# Patient Record
Sex: MALE | Race: WHITE | NOT HISPANIC OR LATINO | Employment: UNEMPLOYED | URBAN - NONMETROPOLITAN AREA
[De-identification: names, ages, dates, MRNs, and addresses within clinical notes are randomized per-mention and may not be internally consistent; named-entity substitution may affect disease eponyms.]

---

## 2024-07-10 ENCOUNTER — APPOINTMENT (OUTPATIENT)
Dept: GENERAL RADIOLOGY | Facility: HOSPITAL | Age: 54
End: 2024-07-10

## 2024-07-10 ENCOUNTER — APPOINTMENT (OUTPATIENT)
Dept: CT IMAGING | Facility: HOSPITAL | Age: 54
End: 2024-07-10

## 2024-07-10 ENCOUNTER — HOSPITAL ENCOUNTER (INPATIENT)
Facility: HOSPITAL | Age: 54
LOS: 2 days | Discharge: HOME | End: 2024-07-12
Attending: EMERGENCY MEDICINE | Admitting: INTERNAL MEDICINE

## 2024-07-10 DIAGNOSIS — I48.92 ATRIAL FLUTTER: Primary | ICD-10-CM

## 2024-07-10 DIAGNOSIS — I48.92 ATRIAL FLUTTER, UNSPECIFIED TYPE: ICD-10-CM

## 2024-07-10 LAB
ALBUMIN SERPL-MCNC: 4.1 G/DL (ref 3.5–5.2)
ALBUMIN/GLOB SERPL: 1.1 G/DL
ALP SERPL-CCNC: 111 U/L (ref 39–117)
ALT SERPL W P-5'-P-CCNC: 21 U/L (ref 1–41)
AMPHET+METHAMPHET UR QL: NEGATIVE
AMPHETAMINES UR QL: NEGATIVE
ANION GAP SERPL CALCULATED.3IONS-SCNC: 15.9 MMOL/L (ref 5–15)
APTT PPP: 31.9 SECONDS (ref 26.5–34.5)
AST SERPL-CCNC: 38 U/L (ref 1–40)
BARBITURATES UR QL SCN: NEGATIVE
BASOPHILS # BLD AUTO: 0.04 10*3/MM3 (ref 0–0.2)
BASOPHILS # BLD AUTO: 0.05 10*3/MM3 (ref 0–0.2)
BASOPHILS NFR BLD AUTO: 0.7 % (ref 0–1.5)
BASOPHILS NFR BLD AUTO: 0.7 % (ref 0–1.5)
BENZODIAZ UR QL SCN: NEGATIVE
BILIRUB SERPL-MCNC: 0.9 MG/DL (ref 0–1.2)
BUN SERPL-MCNC: 5 MG/DL (ref 6–20)
BUN/CREAT SERPL: 7.9 (ref 7–25)
BUPRENORPHINE SERPL-MCNC: NEGATIVE NG/ML
CALCIUM SPEC-SCNC: 8.9 MG/DL (ref 8.6–10.5)
CANNABINOIDS SERPL QL: NEGATIVE
CHLORIDE SERPL-SCNC: 94 MMOL/L (ref 98–107)
CO2 SERPL-SCNC: 22.1 MMOL/L (ref 22–29)
COCAINE UR QL: NEGATIVE
CREAT SERPL-MCNC: 0.63 MG/DL (ref 0.76–1.27)
DEPRECATED RDW RBC AUTO: 47.8 FL (ref 37–54)
DEPRECATED RDW RBC AUTO: 48.2 FL (ref 37–54)
EGFRCR SERPLBLD CKD-EPI 2021: 113 ML/MIN/1.73
EOSINOPHIL # BLD AUTO: 0.15 10*3/MM3 (ref 0–0.4)
EOSINOPHIL # BLD AUTO: 0.15 10*3/MM3 (ref 0–0.4)
EOSINOPHIL NFR BLD AUTO: 2.1 % (ref 0.3–6.2)
EOSINOPHIL NFR BLD AUTO: 2.5 % (ref 0.3–6.2)
ERYTHROCYTE [DISTWIDTH] IN BLOOD BY AUTOMATED COUNT: 14.3 % (ref 12.3–15.4)
ERYTHROCYTE [DISTWIDTH] IN BLOOD BY AUTOMATED COUNT: 14.5 % (ref 12.3–15.4)
FENTANYL UR-MCNC: NEGATIVE NG/ML
GEN 5 2HR TROPONIN T REFLEX: 7 NG/L
GLOBULIN UR ELPH-MCNC: 3.6 GM/DL
GLUCOSE SERPL-MCNC: 86 MG/DL (ref 65–99)
HCT VFR BLD AUTO: 43.1 % (ref 37.5–51)
HCT VFR BLD AUTO: 47.8 % (ref 37.5–51)
HGB BLD-MCNC: 15.2 G/DL (ref 13–17.7)
HGB BLD-MCNC: 17.2 G/DL (ref 13–17.7)
IMM GRANULOCYTES # BLD AUTO: 0.01 10*3/MM3 (ref 0–0.05)
IMM GRANULOCYTES # BLD AUTO: 0.02 10*3/MM3 (ref 0–0.05)
IMM GRANULOCYTES NFR BLD AUTO: 0.1 % (ref 0–0.5)
IMM GRANULOCYTES NFR BLD AUTO: 0.3 % (ref 0–0.5)
INR PPP: 1.14 (ref 0.9–1.1)
LYMPHOCYTES # BLD AUTO: 2.52 10*3/MM3 (ref 0.7–3.1)
LYMPHOCYTES # BLD AUTO: 3.05 10*3/MM3 (ref 0.7–3.1)
LYMPHOCYTES NFR BLD AUTO: 41.7 % (ref 19.6–45.3)
LYMPHOCYTES NFR BLD AUTO: 41.8 % (ref 19.6–45.3)
MCH RBC QN AUTO: 33 PG (ref 26.6–33)
MCH RBC QN AUTO: 33.7 PG (ref 26.6–33)
MCHC RBC AUTO-ENTMCNC: 35.3 G/DL (ref 31.5–35.7)
MCHC RBC AUTO-ENTMCNC: 36 G/DL (ref 31.5–35.7)
MCV RBC AUTO: 93.5 FL (ref 79–97)
MCV RBC AUTO: 93.7 FL (ref 79–97)
METHADONE UR QL SCN: NEGATIVE
MONOCYTES # BLD AUTO: 0.41 10*3/MM3 (ref 0.1–0.9)
MONOCYTES # BLD AUTO: 0.46 10*3/MM3 (ref 0.1–0.9)
MONOCYTES NFR BLD AUTO: 5.6 % (ref 5–12)
MONOCYTES NFR BLD AUTO: 7.6 % (ref 5–12)
NEUTROPHILS NFR BLD AUTO: 2.86 10*3/MM3 (ref 1.7–7)
NEUTROPHILS NFR BLD AUTO: 3.62 10*3/MM3 (ref 1.7–7)
NEUTROPHILS NFR BLD AUTO: 47.2 % (ref 42.7–76)
NEUTROPHILS NFR BLD AUTO: 49.7 % (ref 42.7–76)
NRBC BLD AUTO-RTO: 0 /100 WBC (ref 0–0.2)
NRBC BLD AUTO-RTO: 0 /100 WBC (ref 0–0.2)
OPIATES UR QL: NEGATIVE
OXYCODONE UR QL SCN: NEGATIVE
PCP UR QL SCN: NEGATIVE
PLATELET # BLD AUTO: 232 10*3/MM3 (ref 140–450)
PLATELET # BLD AUTO: 250 10*3/MM3 (ref 140–450)
PMV BLD AUTO: 9.2 FL (ref 6–12)
PMV BLD AUTO: 9.8 FL (ref 6–12)
POTASSIUM SERPL-SCNC: 3.9 MMOL/L (ref 3.5–5.2)
PROT SERPL-MCNC: 7.7 G/DL (ref 6–8.5)
PROTHROMBIN TIME: 14.7 SECONDS (ref 12.1–14.7)
RBC # BLD AUTO: 4.6 10*6/MM3 (ref 4.14–5.8)
RBC # BLD AUTO: 5.11 10*6/MM3 (ref 4.14–5.8)
SODIUM SERPL-SCNC: 132 MMOL/L (ref 136–145)
TRICYCLICS UR QL SCN: NEGATIVE
TROPONIN T DELTA: -2 NG/L
TROPONIN T SERPL HS-MCNC: 9 NG/L
UFH PPP CHRO-ACNC: <0.1 IU/ML (ref 0.3–0.7)
WBC NRBC COR # BLD AUTO: 6.05 10*3/MM3 (ref 3.4–10.8)
WBC NRBC COR # BLD AUTO: 7.29 10*3/MM3 (ref 3.4–10.8)

## 2024-07-10 PROCEDURE — 71045 X-RAY EXAM CHEST 1 VIEW: CPT

## 2024-07-10 PROCEDURE — 25010000002 HEPARIN (PORCINE) 25000-0.45 UT/250ML-% SOLUTION: Performed by: INTERNAL MEDICINE

## 2024-07-10 PROCEDURE — 85520 HEPARIN ASSAY: CPT | Performed by: INTERNAL MEDICINE

## 2024-07-10 PROCEDURE — 80053 COMPREHEN METABOLIC PANEL: CPT | Performed by: INTERNAL MEDICINE

## 2024-07-10 PROCEDURE — 85025 COMPLETE CBC W/AUTO DIFF WBC: CPT | Performed by: EMERGENCY MEDICINE

## 2024-07-10 PROCEDURE — 99223 1ST HOSP IP/OBS HIGH 75: CPT | Performed by: INTERNAL MEDICINE

## 2024-07-10 PROCEDURE — 71250 CT THORAX DX C-: CPT | Performed by: RADIOLOGY

## 2024-07-10 PROCEDURE — 71250 CT THORAX DX C-: CPT

## 2024-07-10 PROCEDURE — 25010000002 ADENOSINE PER 6 MG

## 2024-07-10 PROCEDURE — 99285 EMERGENCY DEPT VISIT HI MDM: CPT

## 2024-07-10 PROCEDURE — 25810000003 LACTATED RINGERS PER 1000 ML: Performed by: INTERNAL MEDICINE

## 2024-07-10 PROCEDURE — 93010 ELECTROCARDIOGRAM REPORT: CPT | Performed by: SPECIALIST

## 2024-07-10 PROCEDURE — 85025 COMPLETE CBC W/AUTO DIFF WBC: CPT | Performed by: INTERNAL MEDICINE

## 2024-07-10 PROCEDURE — 25010000002 HEPARIN (PORCINE) PER 1000 UNITS: Performed by: INTERNAL MEDICINE

## 2024-07-10 PROCEDURE — 80307 DRUG TEST PRSMV CHEM ANLYZR: CPT | Performed by: STUDENT IN AN ORGANIZED HEALTH CARE EDUCATION/TRAINING PROGRAM

## 2024-07-10 PROCEDURE — 71045 X-RAY EXAM CHEST 1 VIEW: CPT | Performed by: RADIOLOGY

## 2024-07-10 PROCEDURE — 83735 ASSAY OF MAGNESIUM: CPT | Performed by: INTERNAL MEDICINE

## 2024-07-10 PROCEDURE — 84443 ASSAY THYROID STIM HORMONE: CPT | Performed by: INTERNAL MEDICINE

## 2024-07-10 PROCEDURE — 85610 PROTHROMBIN TIME: CPT | Performed by: INTERNAL MEDICINE

## 2024-07-10 PROCEDURE — 85730 THROMBOPLASTIN TIME PARTIAL: CPT | Performed by: INTERNAL MEDICINE

## 2024-07-10 PROCEDURE — 25010000002 ADENOSINE PER 6 MG: Performed by: EMERGENCY MEDICINE

## 2024-07-10 PROCEDURE — 80053 COMPREHEN METABOLIC PANEL: CPT | Performed by: EMERGENCY MEDICINE

## 2024-07-10 PROCEDURE — 93005 ELECTROCARDIOGRAM TRACING: CPT | Performed by: EMERGENCY MEDICINE

## 2024-07-10 PROCEDURE — 36415 COLL VENOUS BLD VENIPUNCTURE: CPT

## 2024-07-10 PROCEDURE — 84484 ASSAY OF TROPONIN QUANT: CPT | Performed by: EMERGENCY MEDICINE

## 2024-07-10 RX ORDER — SODIUM CHLORIDE 0.9 % (FLUSH) 0.9 %
10 SYRINGE (ML) INJECTION AS NEEDED
Status: DISCONTINUED | OUTPATIENT
Start: 2024-07-10 | End: 2024-07-12 | Stop reason: HOSPADM

## 2024-07-10 RX ORDER — ADENOSINE 3 MG/ML
12 INJECTION, SOLUTION INTRAVENOUS ONCE
Status: COMPLETED | OUTPATIENT
Start: 2024-07-10 | End: 2024-07-10

## 2024-07-10 RX ORDER — ADENOSINE 3 MG/ML
6 INJECTION, SOLUTION INTRAVENOUS ONCE
Status: COMPLETED | OUTPATIENT
Start: 2024-07-10 | End: 2024-07-10

## 2024-07-10 RX ORDER — BISACODYL 10 MG
10 SUPPOSITORY, RECTAL RECTAL DAILY PRN
Status: DISCONTINUED | OUTPATIENT
Start: 2024-07-10 | End: 2024-07-12 | Stop reason: HOSPADM

## 2024-07-10 RX ORDER — SODIUM CHLORIDE 9 MG/ML
40 INJECTION, SOLUTION INTRAVENOUS AS NEEDED
Status: DISCONTINUED | OUTPATIENT
Start: 2024-07-10 | End: 2024-07-12 | Stop reason: HOSPADM

## 2024-07-10 RX ORDER — SODIUM CHLORIDE, SODIUM LACTATE, POTASSIUM CHLORIDE, CALCIUM CHLORIDE 600; 310; 30; 20 MG/100ML; MG/100ML; MG/100ML; MG/100ML
75 INJECTION, SOLUTION INTRAVENOUS CONTINUOUS
Status: DISCONTINUED | OUTPATIENT
Start: 2024-07-11 | End: 2024-07-12 | Stop reason: HOSPADM

## 2024-07-10 RX ORDER — SODIUM CHLORIDE 0.9 % (FLUSH) 0.9 %
10 SYRINGE (ML) INJECTION EVERY 12 HOURS SCHEDULED
Status: DISCONTINUED | OUTPATIENT
Start: 2024-07-10 | End: 2024-07-12 | Stop reason: HOSPADM

## 2024-07-10 RX ORDER — AMOXICILLIN 250 MG
2 CAPSULE ORAL 2 TIMES DAILY PRN
Status: DISCONTINUED | OUTPATIENT
Start: 2024-07-10 | End: 2024-07-12 | Stop reason: HOSPADM

## 2024-07-10 RX ORDER — DILTIAZEM HYDROCHLORIDE 5 MG/ML
10 INJECTION INTRAVENOUS ONCE
Status: COMPLETED | OUTPATIENT
Start: 2024-07-10 | End: 2024-07-10

## 2024-07-10 RX ORDER — ADENOSINE 3 MG/ML
INJECTION, SOLUTION INTRAVENOUS
Status: COMPLETED
Start: 2024-07-10 | End: 2024-07-10

## 2024-07-10 RX ORDER — NICOTINE 21 MG/24HR
1 PATCH, TRANSDERMAL 24 HOURS TRANSDERMAL
Status: DISCONTINUED | OUTPATIENT
Start: 2024-07-10 | End: 2024-07-12 | Stop reason: HOSPADM

## 2024-07-10 RX ORDER — DILTIAZEM HYDROCHLORIDE 5 MG/ML
20 INJECTION INTRAVENOUS ONCE
Status: DISCONTINUED | OUTPATIENT
Start: 2024-07-10 | End: 2024-07-10

## 2024-07-10 RX ORDER — LORAZEPAM 2 MG/ML
0.5 INJECTION INTRAMUSCULAR EVERY 4 HOURS PRN
Status: DISCONTINUED | OUTPATIENT
Start: 2024-07-10 | End: 2024-07-12 | Stop reason: HOSPADM

## 2024-07-10 RX ORDER — POLYETHYLENE GLYCOL 3350 17 G/17G
17 POWDER, FOR SOLUTION ORAL DAILY PRN
Status: DISCONTINUED | OUTPATIENT
Start: 2024-07-10 | End: 2024-07-12 | Stop reason: HOSPADM

## 2024-07-10 RX ORDER — HEPARIN SODIUM 10000 [USP'U]/100ML
14 INJECTION, SOLUTION INTRAVENOUS
Status: DISCONTINUED | OUTPATIENT
Start: 2024-07-10 | End: 2024-07-11

## 2024-07-10 RX ORDER — HEPARIN SODIUM 5000 [USP'U]/ML
5000 INJECTION, SOLUTION INTRAVENOUS; SUBCUTANEOUS EVERY 8 HOURS SCHEDULED
Status: DISCONTINUED | OUTPATIENT
Start: 2024-07-10 | End: 2024-07-10

## 2024-07-10 RX ORDER — BISACODYL 5 MG/1
5 TABLET, DELAYED RELEASE ORAL DAILY PRN
Status: DISCONTINUED | OUTPATIENT
Start: 2024-07-10 | End: 2024-07-12 | Stop reason: HOSPADM

## 2024-07-10 RX ADMIN — Medication 10 ML: at 22:53

## 2024-07-10 RX ADMIN — SODIUM CHLORIDE, POTASSIUM CHLORIDE, SODIUM LACTATE AND CALCIUM CHLORIDE 75 ML/HR: 600; 310; 30; 20 INJECTION, SOLUTION INTRAVENOUS at 23:40

## 2024-07-10 RX ADMIN — HEPARIN SODIUM 5000 UNITS: 5000 INJECTION INTRAVENOUS; SUBCUTANEOUS at 22:53

## 2024-07-10 RX ADMIN — DILTIAZEM HYDROCHLORIDE 10 MG: 5 INJECTION INTRAVENOUS at 18:09

## 2024-07-10 RX ADMIN — ADENOSINE 6 MG: 3 INJECTION, SOLUTION INTRAVENOUS at 17:55

## 2024-07-10 RX ADMIN — SODIUM CHLORIDE 5 MG/HR: 900 INJECTION, SOLUTION INTRAVENOUS at 18:08

## 2024-07-10 RX ADMIN — ADENOSINE 12 MG: 3 INJECTION, SOLUTION INTRAVENOUS at 17:59

## 2024-07-10 RX ADMIN — DILTIAZEM HYDROCHLORIDE 10 MG: 5 INJECTION INTRAVENOUS at 18:14

## 2024-07-10 RX ADMIN — HEPARIN SODIUM 12 UNITS/KG/HR: 10000 INJECTION, SOLUTION INTRAVENOUS at 23:41

## 2024-07-10 NOTE — ED PROVIDER NOTES
"Subjective   History of Present Illness  54-year-old white male presents for abnormal EKG.  Patient was seen for his regular checkup and noted to be tachycardic.  His provider did an EKG which was abnormal and referred him to the ED.  He states that he had had an episode when he was in his 30s where he had to come to the ER and had to be given medicine \"which stopped my heart\".  He has not had any recent chest pain, shortness of breath, palpitations or other complaints.  He smokes 1 pack a day and drinks 8-10 beers per day.  Denies any other drug use.      Review of Systems   All other systems reviewed and are negative.      No past medical history on file.    No Known Allergies    No past surgical history on file.    No family history on file.    Social History     Socioeconomic History   • Marital status: Single           Objective   Physical Exam  Vitals and nursing note reviewed. Exam conducted with a chaperone present.   Constitutional:       Appearance: Normal appearance. He is normal weight.   HENT:      Head: Normocephalic and atraumatic.      Mouth/Throat:      Mouth: Mucous membranes are moist.      Pharynx: Oropharynx is clear.   Cardiovascular:      Rate and Rhythm: Normal rate and regular rhythm.      Heart sounds: Normal heart sounds. No murmur heard.     No friction rub. No gallop.   Pulmonary:      Effort: Pulmonary effort is normal. No respiratory distress.      Breath sounds: Normal breath sounds. No wheezing, rhonchi or rales.   Chest:      Chest wall: No tenderness.   Abdominal:      General: Abdomen is flat. Bowel sounds are normal. There is no distension.      Palpations: Abdomen is soft.      Tenderness: There is no abdominal tenderness.   Musculoskeletal:         General: Normal range of motion.      Right lower leg: No edema.      Left lower leg: No edema.   Skin:     General: Skin is warm and dry.   Neurological:      General: No focal deficit present.      Mental Status: He is alert and " oriented to person, place, and time.   Psychiatric:         Mood and Affect: Mood normal.         Behavior: Behavior normal.         Procedures       Results for orders placed or performed during the hospital encounter of 07/10/24   Comprehensive Metabolic Panel    Specimen: Arm, Right; Blood   Result Value Ref Range    Glucose 86 65 - 99 mg/dL    BUN 5 (L) 6 - 20 mg/dL    Creatinine 0.63 (L) 0.76 - 1.27 mg/dL    Sodium 132 (L) 136 - 145 mmol/L    Potassium 3.9 3.5 - 5.2 mmol/L    Chloride 94 (L) 98 - 107 mmol/L    CO2 22.1 22.0 - 29.0 mmol/L    Calcium 8.9 8.6 - 10.5 mg/dL    Total Protein 7.7 6.0 - 8.5 g/dL    Albumin 4.1 3.5 - 5.2 g/dL    ALT (SGPT) 21 1 - 41 U/L    AST (SGOT) 38 1 - 40 U/L    Alkaline Phosphatase 111 39 - 117 U/L    Total Bilirubin 0.9 0.0 - 1.2 mg/dL    Globulin 3.6 gm/dL    A/G Ratio 1.1 g/dL    BUN/Creatinine Ratio 7.9 7.0 - 25.0    Anion Gap 15.9 (H) 5.0 - 15.0 mmol/L    eGFR 113.0 >60.0 mL/min/1.73   High Sensitivity Troponin T    Specimen: Arm, Right; Blood   Result Value Ref Range    HS Troponin T 9 <22 ng/L   CBC Auto Differential    Specimen: Arm, Right; Blood   Result Value Ref Range    WBC 7.29 3.40 - 10.80 10*3/mm3    RBC 5.11 4.14 - 5.80 10*6/mm3    Hemoglobin 17.2 13.0 - 17.7 g/dL    Hematocrit 47.8 37.5 - 51.0 %    MCV 93.5 79.0 - 97.0 fL    MCH 33.7 (H) 26.6 - 33.0 pg    MCHC 36.0 (H) 31.5 - 35.7 g/dL    RDW 14.3 12.3 - 15.4 %    RDW-SD 47.8 37.0 - 54.0 fl    MPV 9.2 6.0 - 12.0 fL    Platelets 250 140 - 450 10*3/mm3    Neutrophil % 49.7 42.7 - 76.0 %    Lymphocyte % 41.8 19.6 - 45.3 %    Monocyte % 5.6 5.0 - 12.0 %    Eosinophil % 2.1 0.3 - 6.2 %    Basophil % 0.7 0.0 - 1.5 %    Immature Grans % 0.1 0.0 - 0.5 %    Neutrophils, Absolute 3.62 1.70 - 7.00 10*3/mm3    Lymphocytes, Absolute 3.05 0.70 - 3.10 10*3/mm3    Monocytes, Absolute 0.41 0.10 - 0.90 10*3/mm3    Eosinophils, Absolute 0.15 0.00 - 0.40 10*3/mm3    Basophils, Absolute 0.05 0.00 - 0.20 10*3/mm3    Immature Grans,  Absolute 0.01 0.00 - 0.05 10*3/mm3    nRBC 0.0 0.0 - 0.2 /100 WBC   ECG 12 Lead Tachycardia   Result Value Ref Range    QT Interval 316 ms    QTC Interval 509 ms   ECG 12 Lead Tachycardia   Result Value Ref Range    QT Interval 306 ms    QTC Interval 528 ms         ED Course  ED Course as of 07/10/24 2034   Wed Jul 10, 2024   1814 ECG 12 Lead Tachycardia  SVT.  Rate 179.  Normal axis.  Inferior T wave inversions.  No acute ischemic changes.  Abnormal EKG. [BC]   1816 ECG 12 Lead Tachycardia  Narrow complex tachycardia, probably atrial flutter.  Rate 156.  Normal axis.  Prolonged QT interval.  Inferior T wave inversions.  No acute ischemic changes.  Abnormal EKG [BC]   1950 Patient care was taken over from Dr. Stone at shift change.  Patient remains in atrial flutter with variable conduction between 3-1 and 41 conduction.  Currently on Cardizem 12.5.  -Plan to admit to hospitalist    CT chest and abdomen have been ordered rule out additional possible causes of irregular heart rate.  Urine drug screen is also been ordered.      Electronially signed by Miroslava Bradford DO, 07/10/24, 7:51 PM EDT.   [LK]   2019 Spoke with Dr. Vaca who accepted patient for PCU level of care.  Drug screen, CT chest been added to look for additional precipitating factors for forted new onset cardiac arrhythmia. [LK]      ED Course User Index  [BC] Jas Stone MD  [LK] Miroslava Bradford DO                                             Medical Decision Making  Amount and/or Complexity of Data Reviewed  Labs: ordered.  Radiology: ordered.  ECG/medicine tests: ordered. Decision-making details documented in ED Course.    Risk  Prescription drug management.        Final diagnoses:   Atrial flutter, unspecified type       ED Disposition  ED Disposition       ED Disposition   Intended Admit    Condition   --    Comment   --               No follow-up provider specified.       Medication List      No changes were made to your prescriptions during this  visit.          Ref Range    Heparin Anti-Xa (UFH) 0.16 (L) 0.30 - 0.70 IU/ml   Basic Metabolic Panel    Specimen: Blood   Result Value Ref Range    Glucose 95 65 - 99 mg/dL    BUN 6 6 - 20 mg/dL    Creatinine 0.59 (L) 0.76 - 1.27 mg/dL    Sodium 130 (L) 136 - 145 mmol/L    Potassium 4.3 3.5 - 5.2 mmol/L    Chloride 97 (L) 98 - 107 mmol/L    CO2 24.7 22.0 - 29.0 mmol/L    Calcium 8.2 (L) 8.6 - 10.5 mg/dL    BUN/Creatinine Ratio 10.2 7.0 - 25.0    Anion Gap 8.3 5.0 - 15.0 mmol/L    eGFR 115.3 >60.0 mL/min/1.73   Magnesium    Specimen: Blood   Result Value Ref Range    Magnesium 1.5 (L) 1.6 - 2.6 mg/dL   ECG 12 Lead Tachycardia   Result Value Ref Range    QT Interval 316 ms    QTC Interval 509 ms   ECG 12 Lead Tachycardia   Result Value Ref Range    QT Interval 306 ms    QTC Interval 528 ms   ECG 12 Lead Rhythm Change   Result Value Ref Range    QT Interval 332 ms    QTC Interval 453 ms   ECG 12 Lead Other; s/p cardioversion   Result Value Ref Range    QT Interval 382 ms    QTC Interval 426 ms   Adult Transthoracic Echo Complete W/ Cont if Necessary Per Protocol   Result Value Ref Range    LVIDd 3.9 cm    LVIDs 3.7 cm    IVSd 0.98 cm    LVPWd 0.9 cm    FS 5.9 %    IVS/LVPW 0.78 cm    ESV(cubed) 50.2 ml    LV Sys Vol (BSA corrected) 6.8 cm2    EDV(cubed) 60.2 ml    LV Lowe Vol (BSA corrected) 16.7 cm2    LV mass(C)d 145.0 grams    LVOT area 2.5 cm2    LVOT diam 1.80 cm    EDV(MOD-sp4) 29.7 ml    ESV(MOD-sp4) 12.1 ml    SV(MOD-sp4) 17.6 ml    SVi(MOD-SP4) 9.9 ml/m2    EF(MOD-sp4) 59.3 %    MV E max pedro 65.1 cm/sec    MV A max pedro 51.8 cm/sec    MV E/A 1.26     LA ESV Index (BP) 13.5 ml/m2    Med Peak E' Pedro 8.6 cm/sec    Lat Peak E' Pedro 11.5 cm/sec    TR max pedro 266.0 cm/sec    Avg E/e' ratio 6.48     TAPSE (>1.6) 2.37 cm    LA dimension (2D)  3.2 cm    Ao pk pedro 116.0 cm/sec    Ao max PG 5.4 mmHg    Ao mean PG 3.0 mmHg    Ao V2 VTI 20.7 cm    TR max PG 28.3 mmHg    RVSP(TR) 38.3 mmHg    RAP systole 10.0 mmHg    PA acc time 0.11 sec     Ao root diam 2.6 cm         ED Course  ED Course as of 07/28/24 0834   Wed Jul 10, 2024   1814 ECG 12 Lead Tachycardia  SVT.  Rate 179.  Normal axis.  Inferior T wave inversions.  No acute ischemic changes.  Abnormal EKG. [BC]   1816 ECG 12 Lead Tachycardia  Narrow complex tachycardia, probably atrial flutter.  Rate 156.  Normal axis.  Prolonged QT interval.  Inferior T wave inversions.  No acute ischemic changes.  Abnormal EKG [BC]   1950 Patient care was taken over from Dr. Stone at shift change.  Patient remains in atrial flutter with variable conduction between 3-1 and 41 conduction.  Currently on Cardizem 12.5.  -Plan to admit to hospitalist    CT chest and abdomen have been ordered rule out additional possible causes of irregular heart rate.  Urine drug screen is also been ordered.      Electronially signed by Miroslava Bradford DO, 07/10/24, 7:51 PM EDT.   [LK]   2019 Spoke with Dr. Poe who accepted patient for PCU level of care.  Drug screen, CT chest been added to look for additional precipitating factors for forted new onset cardiac arrhythmia. [LK]      ED Course User Index  [BC] Jas Stone MD  [LK] Miroslava Bradford DO                                             Medical Decision Making  Problems Addressed:  Atrial flutter, unspecified type: complicated acute illness or injury    Amount and/or Complexity of Data Reviewed  Labs: ordered.  Radiology: ordered.  ECG/medicine tests: ordered. Decision-making details documented in ED Course.    Risk  Prescription drug management.  Decision regarding hospitalization.        Final diagnoses:   Atrial flutter, unspecified type       ED Disposition  ED Disposition       ED Disposition   Decision to Admit    Condition   --    Comment   Level of Care: Progressive Care [20]   Diagnosis: Atrial flutter [427.32.ICD-9-CM]   Admitting Physician: JAEL POE [[id number]]   Certification: I Certify That Inpatient Hospital Services Are Medically Necessary For Greater Than 2  Midnights                 Margarita Loyd, APRN  [address] [facility] [address]  [phone]    Follow up in 3 week(s)      Ramón Lyons MD  [address] [place] [address] [phone]    Follow up in 6 day(s)      Provider, No Known  [facility] [address]  [phone]               Medication List        New Prescriptions      Eliquis 5 MG tablet tablet  Generic drug: apixaban  Take 1 tablet by mouth Every 12 (Twelve) Hours. Indications: Atrial Fibrillation               Where to Get Your Medications        These medications were sent to [facility] [address] [place] [address]      Hours: Monday to Friday 7 AM to 6 PM Phone: [phone]   Eliquis 5 MG tablet tablet       [facility] [place] [facility] [address] - [phone]  - [phone]   [address] [facility] [place] [place] [address]      Phone: [phone]   metoprolol tartrate 50 MG tablet  pantoprazole 20 MG EC tablet            Miroslava Bradford DO  07/28/24 0814

## 2024-07-10 NOTE — ED NOTES
Patient has been brought back to room in wheelchair at this time. Patient has been placed on Zoll monitor at this time. Dr. Stone and lead RN are at bedside with patient at this time. Patient placed on cardiac monitoring.

## 2024-07-11 ENCOUNTER — APPOINTMENT (OUTPATIENT)
Dept: CARDIOLOGY | Facility: HOSPITAL | Age: 54
End: 2024-07-11

## 2024-07-11 ENCOUNTER — APPOINTMENT (OUTPATIENT)
Dept: ULTRASOUND IMAGING | Facility: HOSPITAL | Age: 54
End: 2024-07-11

## 2024-07-11 ENCOUNTER — ANESTHESIA EVENT (OUTPATIENT)
Dept: CARDIOLOGY | Facility: HOSPITAL | Age: 54
End: 2024-07-11

## 2024-07-11 ENCOUNTER — ANESTHESIA (OUTPATIENT)
Dept: CARDIOLOGY | Facility: HOSPITAL | Age: 54
End: 2024-07-11

## 2024-07-11 DIAGNOSIS — I48.92 ATRIAL FLUTTER, UNSPECIFIED TYPE: Primary | ICD-10-CM

## 2024-07-11 PROBLEM — E44.0 MODERATE MALNUTRITION: Status: ACTIVE | Noted: 2024-07-11

## 2024-07-11 LAB
ALBUMIN SERPL-MCNC: 3.5 G/DL (ref 3.5–5.2)
ALBUMIN/GLOB SERPL: 1.2 G/DL
ALP SERPL-CCNC: 94 U/L (ref 39–117)
ALT SERPL W P-5'-P-CCNC: 18 U/L (ref 1–41)
ANION GAP SERPL CALCULATED.3IONS-SCNC: 18.9 MMOL/L (ref 5–15)
AST SERPL-CCNC: 33 U/L (ref 1–40)
BH CV ECHO MEAS - AO MAX PG: 5.4 MMHG
BH CV ECHO MEAS - AO MEAN PG: 3 MMHG
BH CV ECHO MEAS - AO ROOT DIAM: 2.6 CM
BH CV ECHO MEAS - AO V2 MAX: 116 CM/SEC
BH CV ECHO MEAS - AO V2 VTI: 20.7 CM
BH CV ECHO MEAS - EDV(CUBED): 60.2 ML
BH CV ECHO MEAS - EDV(MOD-SP4): 29.7 ML
BH CV ECHO MEAS - EF(MOD-SP4): 59.3 %
BH CV ECHO MEAS - ESV(CUBED): 50.2 ML
BH CV ECHO MEAS - ESV(MOD-SP4): 12.1 ML
BH CV ECHO MEAS - FS: 5.9 %
BH CV ECHO MEAS - IVS/LVPW: 0.78 CM
BH CV ECHO MEAS - IVSD: 0.98 CM
BH CV ECHO MEAS - LA DIMENSION: 3.2 CM
BH CV ECHO MEAS - LAT PEAK E' VEL: 11.5 CM/SEC
BH CV ECHO MEAS - LV DIASTOLIC VOL/BSA (35-75): 16.7 CM2
BH CV ECHO MEAS - LV MASS(C)D: 145 GRAMS
BH CV ECHO MEAS - LV SYSTOLIC VOL/BSA (12-30): 6.8 CM2
BH CV ECHO MEAS - LVIDD: 3.9 CM
BH CV ECHO MEAS - LVIDS: 3.7 CM
BH CV ECHO MEAS - LVOT AREA: 2.5 CM2
BH CV ECHO MEAS - LVOT DIAM: 1.8 CM
BH CV ECHO MEAS - LVPWD: 0.9 CM
BH CV ECHO MEAS - MED PEAK E' VEL: 8.6 CM/SEC
BH CV ECHO MEAS - MV A MAX VEL: 51.8 CM/SEC
BH CV ECHO MEAS - MV E MAX VEL: 65.1 CM/SEC
BH CV ECHO MEAS - MV E/A: 1.26
BH CV ECHO MEAS - PA ACC TIME: 0.11 SEC
BH CV ECHO MEAS - RAP SYSTOLE: 10 MMHG
BH CV ECHO MEAS - RVSP: 38.3 MMHG
BH CV ECHO MEAS - SV(MOD-SP4): 17.6 ML
BH CV ECHO MEAS - SVI(MOD-SP4): 9.9 ML/M2
BH CV ECHO MEAS - TAPSE (>1.6): 2.37 CM
BH CV ECHO MEAS - TR MAX PG: 28.3 MMHG
BH CV ECHO MEAS - TR MAX VEL: 266 CM/SEC
BH CV ECHO MEASUREMENTS AVERAGE E/E' RATIO: 6.48
BILIRUB SERPL-MCNC: 1 MG/DL (ref 0–1.2)
BUN SERPL-MCNC: 4 MG/DL (ref 6–20)
BUN/CREAT SERPL: 7.8 (ref 7–25)
CALCIUM SPEC-SCNC: 8.1 MG/DL (ref 8.6–10.5)
CHLORIDE SERPL-SCNC: 101 MMOL/L (ref 98–107)
CO2 SERPL-SCNC: 17.1 MMOL/L (ref 22–29)
CREAT SERPL-MCNC: 0.51 MG/DL (ref 0.76–1.27)
EGFRCR SERPLBLD CKD-EPI 2021: 120.5 ML/MIN/1.73
GLOBULIN UR ELPH-MCNC: 2.9 GM/DL
GLUCOSE SERPL-MCNC: 71 MG/DL (ref 65–99)
LEFT ATRIUM VOLUME INDEX: 13.5 ML/M2
MAGNESIUM SERPL-MCNC: 1.6 MG/DL (ref 1.6–2.6)
POTASSIUM SERPL-SCNC: 3.6 MMOL/L (ref 3.5–5.2)
PROT SERPL-MCNC: 6.4 G/DL (ref 6–8.5)
QT INTERVAL: 306 MS
QT INTERVAL: 316 MS
QT INTERVAL: 332 MS
QT INTERVAL: 382 MS
QTC INTERVAL: 426 MS
QTC INTERVAL: 453 MS
QTC INTERVAL: 509 MS
QTC INTERVAL: 528 MS
SODIUM SERPL-SCNC: 137 MMOL/L (ref 136–145)
TSH SERPL DL<=0.05 MIU/L-ACNC: 1.92 UIU/ML (ref 0.27–4.2)
UFH PPP CHRO-ACNC: 0.14 IU/ML (ref 0.3–0.7)
UFH PPP CHRO-ACNC: 0.16 IU/ML (ref 0.3–0.7)

## 2024-07-11 PROCEDURE — 93880 EXTRACRANIAL BILAT STUDY: CPT

## 2024-07-11 PROCEDURE — 93325 DOPPLER ECHO COLOR FLOW MAPG: CPT | Performed by: SPECIALIST

## 2024-07-11 PROCEDURE — 85520 HEPARIN ASSAY: CPT | Performed by: INTERNAL MEDICINE

## 2024-07-11 PROCEDURE — B246ZZ4 ULTRASONOGRAPHY OF RIGHT AND LEFT HEART, TRANSESOPHAGEAL: ICD-10-PCS | Performed by: SPECIALIST

## 2024-07-11 PROCEDURE — 93325 DOPPLER ECHO COLOR FLOW MAPG: CPT

## 2024-07-11 PROCEDURE — 92960 CARDIOVERSION ELECTRIC EXT: CPT | Performed by: SPECIALIST

## 2024-07-11 PROCEDURE — 5A2204Z RESTORATION OF CARDIAC RHYTHM, SINGLE: ICD-10-PCS | Performed by: SPECIALIST

## 2024-07-11 PROCEDURE — 93005 ELECTROCARDIOGRAM TRACING: CPT | Performed by: INTERNAL MEDICINE

## 2024-07-11 PROCEDURE — 93005 ELECTROCARDIOGRAM TRACING: CPT | Performed by: SPECIALIST

## 2024-07-11 PROCEDURE — 93306 TTE W/DOPPLER COMPLETE: CPT | Performed by: SPECIALIST

## 2024-07-11 PROCEDURE — 93321 DOPPLER ECHO F-UP/LMTD STD: CPT

## 2024-07-11 PROCEDURE — 25810000003 LACTATED RINGERS PER 1000 ML: Performed by: INTERNAL MEDICINE

## 2024-07-11 PROCEDURE — 93010 ELECTROCARDIOGRAM REPORT: CPT | Performed by: SPECIALIST

## 2024-07-11 PROCEDURE — 99254 IP/OBS CNSLTJ NEW/EST MOD 60: CPT | Performed by: SPECIALIST

## 2024-07-11 PROCEDURE — 92960 CARDIOVERSION ELECTRIC EXT: CPT

## 2024-07-11 PROCEDURE — 93312 ECHO TRANSESOPHAGEAL: CPT | Performed by: SPECIALIST

## 2024-07-11 PROCEDURE — 93312 ECHO TRANSESOPHAGEAL: CPT

## 2024-07-11 PROCEDURE — 93321 DOPPLER ECHO F-UP/LMTD STD: CPT | Performed by: SPECIALIST

## 2024-07-11 PROCEDURE — 93306 TTE W/DOPPLER COMPLETE: CPT

## 2024-07-11 PROCEDURE — 99232 SBSQ HOSP IP/OBS MODERATE 35: CPT | Performed by: INTERNAL MEDICINE

## 2024-07-11 RX ORDER — METOPROLOL TARTRATE 50 MG/1
50 TABLET, FILM COATED ORAL 2 TIMES DAILY
Status: ON HOLD | COMMUNITY
End: 2024-07-12

## 2024-07-11 RX ORDER — METOPROLOL TARTRATE 50 MG/1
50 TABLET, FILM COATED ORAL 2 TIMES DAILY
Status: CANCELLED | OUTPATIENT
Start: 2024-07-11

## 2024-07-11 RX ORDER — PANTOPRAZOLE SODIUM 20 MG/1
40 TABLET, DELAYED RELEASE ORAL DAILY
Status: ON HOLD | COMMUNITY
End: 2024-07-12

## 2024-07-11 RX ORDER — PANTOPRAZOLE SODIUM 20 MG/1
20 TABLET, DELAYED RELEASE ORAL DAILY
Status: CANCELLED | OUTPATIENT
Start: 2024-07-11

## 2024-07-11 RX ORDER — METOPROLOL TARTRATE 50 MG/1
50 TABLET, FILM COATED ORAL EVERY 12 HOURS SCHEDULED
Status: DISCONTINUED | OUTPATIENT
Start: 2024-07-11 | End: 2024-07-12

## 2024-07-11 RX ADMIN — APIXABAN 5 MG: 5 TABLET, FILM COATED ORAL at 21:34

## 2024-07-11 RX ADMIN — METOPROLOL TARTRATE 50 MG: 50 TABLET, FILM COATED ORAL at 13:36

## 2024-07-11 RX ADMIN — SODIUM CHLORIDE 5 MG/HR: 900 INJECTION, SOLUTION INTRAVENOUS at 03:53

## 2024-07-11 RX ADMIN — Medication 10 ML: at 21:34

## 2024-07-11 RX ADMIN — SODIUM CHLORIDE, POTASSIUM CHLORIDE, SODIUM LACTATE AND CALCIUM CHLORIDE 75 ML/HR: 600; 310; 30; 20 INJECTION, SOLUTION INTRAVENOUS at 13:01

## 2024-07-11 RX ADMIN — METOPROLOL TARTRATE 50 MG: 50 TABLET, FILM COATED ORAL at 21:34

## 2024-07-11 RX ADMIN — Medication 10 ML: at 08:52

## 2024-07-11 RX ADMIN — APIXABAN 5 MG: 5 TABLET, FILM COATED ORAL at 13:36

## 2024-07-11 NOTE — PROGRESS NOTES
Nutrition Services    Patient Name:  Santana Dominguez  YOB: 1970  MRN: [id number]  Admit Date:  7/10/2024    Malnutrition Severity Assessment      Patient meets criteria for : Moderate (non-severe) Malnutrition  Malnutrition Type (Last 8 Hours)       Malnutrition Severity Assessment       Row Name 07/11/24 1525       Malnutrition Severity Assessment    Malnutrition Type Acute Disease or Injury - Related Malnutrition      Row Name 07/11/24 1525       Muscle Loss    Loss of Muscle Mass Findings Moderate    Temple Region None    Clavicle Bone Region Moderate - some protrusion in females, visible in males    Acromion Bone Region Moderate - acromion may slightly protrude    Scapular Bone Region Moderate - mild depression, bones may show slightly    Dorsal Hand Region None    Patellar Region Moderate - patella more prominent, less muscle definition around patella    Anterior Thigh Region Moderate - mild depression on inner thigh    Posterior Calf Region Moderate - some roundness, slight firmness      Row Name 07/11/24 1525       Fat Loss    Subcutaneous Fat Loss Findings Moderate    Orbital Region  None    Upper Arm Region Moderate - some fat tissue, not ample    Thoracic & Lumbar Region Moderate - ribs visible with mild depressions, iliac crest somewhat prominent      Row Name 07/11/24 1525       Criteria Met (Must meet criteria for severity in at least 2 of these categories: M Wasting, Fat Loss, Fluid, Secondary Signs, Wt. Status, Intake)    Patient meets criteria for  Moderate (non-severe) Malnutrition                         Electronically signed by:  Rebekah Gaines RD  07/11/24 15:35 EDT

## 2024-07-11 NOTE — PLAN OF CARE
Goal Outcome Evaluation:  Plan of Care Reviewed With: patient        Progress: no change  Outcome Evaluation: Pt A&Ox4. RA. Pt on cardizem gtt, heparin gtt, and NS @75. Pt NPO at this time. No complaints of pain or discomort. Bed alarm on, call light within reach.

## 2024-07-11 NOTE — SIGNIFICANT NOTE
[facility]  SIGNIFICANT EVENT NOTE      Patient information:  Name: Santana Dominguez  Age/Sex: 54 y.o. male  :  1970        PCP: Provider, No Known  Attending: Ghislaine Vaca DO  MRN:  [id number]  Visit Number:  [id number]    LOS:  LOS: 1 day   CODE STATUS:   Code Status and Medical Interventions:   Ordered at: 07/10/24 2038     Code Status (Patient has no pulse and is not breathing):    CPR (Attempt to Resuscitate)     Medical Interventions (Patient has pulse or is breathing):    Full Support       PROBLEM LIST:Principal Problem:    Atrial flutter    Reason for Cardiology follow-up: New onset atrial flutter variable conduction - now in SR     Subjective Data:   EVENT/HPI:    Post AREN guided ECV: successful.     Objective Data:   Temp:  [97.5 °F (36.4 °C)-98.1 °F (36.7 °C)] 97.5 °F (36.4 °C)  Heart Rate:  [] 112  Resp:  [15-20] 16  BP: ()/() 106/95  Device (Oxygen Therapy): room air  Vital Signs (last 72 hrs)          0700  07/ 0659  0700  07/10 0659 07/10 0700   0659  0700   1213   Most Recent      Temp (°F)     97.8 -  98.1    97.5 -  97.8     97.5 (36.4)  1000    Heart Rate     51 -  165    91 -  137     112  1147    Resp     15 -  20    16 -  20     16  1147    BP     77/60 -  132/107    96/75 -  127/89     106/95  1147    SpO2 (%)     96 -  100    96 -  100     100  1147          Body mass index is 17.79 kg/m².      07/10/24  1736 24  0400   Weight: 56.7 kg (125 lb) 59.5 kg (131 lb 2.8 oz)     Results Reviewed:   TELEMETRY:  SR 90s post AREN guided ECV             Active medication list:   apixaban, 5 mg, Oral, Q12H  metoprolol tartrate, 50 mg, Oral, Q12H  nicotine, 1 patch, Transdermal, Q24H  sodium chloride, 10 mL, Intravenous, Q12H      lactated ringers, 75 mL/hr, Last Rate: 75 mL/hr (24 9537)  Pharmacy to Dose Heparin,       Assessment and Plan:   Discussed case with  Dr. Annette Alfaro.  Start Metoprolol Tartrate 50 mg PO BID.     COY Calzada (Robin)   [facility] 12:13 EDT  7/11/2024    Electronically signed by COY Barajas, 07/11/24, 12:18 PM EDT.             Please note that portions of this note were copied and has been reviewed and is accurate as of 7/11/2024 .      Please note that portions of this note were completed with a voice recognition program.

## 2024-07-11 NOTE — ANESTHESIA POSTPROCEDURE EVALUATION
Patient: Santana Dominguez    Procedure Summary       Date: 07/11/24 Room / Location: [facility]    Anesthesia Start: 1152 Anesthesia Stop: 1212    Procedure: CARDIOVERSION EXTERNAL IN CARDIOLOGY DEPARTMENT Diagnosis: (Atrial fibrillation)    Scheduled Providers: Yanick Bryant MD Provider: Ted Elizalde MD    Anesthesia Type: general ASA Status: 3            Anesthesia Type: general    Vitals  Vitals Value Taken Time   /90 07/11/24 1209   Temp     Pulse 84 07/11/24 1213   Resp     SpO2 92 % 07/11/24 1213   Vitals shown include unfiled device data.        Post Anesthesia Care and Evaluation    Patient location during evaluation: bedside  Patient participation: complete - patient participated  Level of consciousness: awake and alert  Pain score: 1  Pain management: adequate    Airway patency: patent  Anesthetic complications: No anesthetic complications  PONV Status: controlled  Cardiovascular status: acceptable  Respiratory status: acceptable  Hydration status: acceptable    Comments: 99/71  95%  83  16  98  No anesthesia care post op

## 2024-07-11 NOTE — PROGRESS NOTES
[facility]     Patient Identification:  Name:  Santana Dominguez  Age:  54 y.o.  Sex:  male  :  1970  MRN:  [id number]  Visit Number:  [id number]  Primary Care Provider:  Provider, No Known    Length of stay:  1    Chief complaint:  None    Subjective:    Patient seen and examined at bedside with no nursing staff present. Patient states he is feeling well today, but does have some minor shortness of breath.  He denies any palpitations, nausea, vomiting or any other symptoms at this time.  Did discuss possible need for AREN with cardioversion today, and patient states he is agreeable to this procedure if cardiology deems necessary.  ----------------------------------------------------------------------------------------------------------------------  Current Hospital Meds:  apixaban, 5 mg, Oral, Q12H  metoprolol tartrate, 50 mg, Oral, Q12H  nicotine, 1 patch, Transdermal, Q24H  sodium chloride, 10 mL, Intravenous, Q12H      lactated ringers, 75 mL/hr, Last Rate: 75 mL/hr (24 1301)      ----------------------------------------------------------------------------------------------------------------------  Vital Signs:  Temp:  [97.8 °F (36.6 °C)-98.2 °F (36.8 °C)] 98.2 °F (36.8 °C)  Heart Rate:  [] 66  Resp:  [15-20] 18  BP: ()/() 122/88      07/10/24  1736 24  0400   Weight: 56.7 kg (125 lb) 59.5 kg (131 lb 2.8 oz)     Body mass index is 17.79 kg/m².    Intake/Output Summary (Last 24 hours) at 2024 1557  Last data filed at 2024 0629  Gross per 24 hour   Intake 558.11 ml   Output 450 ml   Net 108.11 ml     Diet: Cardiac; Healthy Heart (2-3 Na+); Fluid Consistency: Thin (IDDSI 0)  ----------------------------------------------------------------------------------------------------------------------  Physical exam:  Constitutional: Well-nourished  male in no apparent distress.     HENT:  Head:  Normocephalic and atraumatic.   Mouth:  Moist mucous membranes.    Eyes:  Conjunctivae and EOM are normal.  Pupils are equal, round, and reactive to light.  No scleral icterus.    Neck:  Neck supple. No thyromegaly.  No JVD present.    Cardiovascular:  Irregular rhythm with average HR of 100 with no murmurs, rubs, clicks or gallops appreciated.  Pulmonary/Chest:  Clear to auscultation bilaterally with no crackles, wheezes or rhonchi appreciated.  Abdominal:  Soft. Nontender. Nondistended  Bowel sounds are normal in all four quadrants. No organomegally appreciated.   Musculoskeletal:  5/5 muscle strength bilateral upper and lower extremities with equal muscle tone and bulk. No edema, no tenderness, and no deformity.  No red or swollen joints anywhere.    Neurological:  Alert, Cranial nerves II-XII intact with no focal deficits.  No facial droop.  No slurred speech.   Skin:  Warm and dry to palpation with no rashes or lesions appreciated.  Peripheral vascular:  2+ radial and pedal pulses in bilateral upper and lower extremities.  Psychiatric:  Alert and oriented x3, demonstrates appropriate judgment and insight.  ---------------------------------------------------------------------------------  ----------------------------------------------------------------------------------------------------------------------  Results from last 7 days   Lab Units 07/10/24  2105 07/10/24  1803   HSTROP T ng/L 7 9     Results from last 7 days   Lab Units 07/10/24  2302 07/10/24  1803   WBC 10*3/mm3 6.05 7.29   HEMOGLOBIN g/dL 15.2 17.2   HEMATOCRIT % 43.1 47.8   MCV fL 93.7 93.5   MCHC g/dL 35.3 36.0*   PLATELETS 10*3/mm3 232 250   INR  1.14*  --          Results from last 7 days   Lab Units 07/10/24  2302 07/10/24  1803   SODIUM mmol/L 137 132*   POTASSIUM mmol/L 3.6 3.9   MAGNESIUM mg/dL 1.6  --    CHLORIDE mmol/L 101 94*   CO2 mmol/L 17.1* 22.1   BUN mg/dL 4* 5*   CREATININE mg/dL 0.51* 0.63*   CALCIUM mg/dL 8.1* 8.9   GLUCOSE mg/dL 71 86   ALBUMIN g/dL 3.5 4.1  "  BILIRUBIN mg/dL 1.0 0.9   ALK PHOS U/L 94 111   AST (SGOT) U/L 33 38   ALT (SGPT) U/L 18 21   Estimated Creatinine Clearance: 139.4 mL/min (A) (by C-G formula based on SCr of 0.51 mg/dL (L)).    No results found for: \"AMMONIA\"      No results found for: \"BLOODCX\"  No results found for: \"URINECX\"  No results found for: \"WOUNDCX\"  No results found for: \"STOOLCX\"    I have personally looked at the labs and they are summarized above.  ----------------------------------------------------------------------------------------------------------------------  Imaging Results (Last 24 Hours)       Procedure Component Value Units Date/Time    CT Chest Without Contrast Diagnostic [[id number]] Collected: 07/10/24 2110     Updated: 07/10/24 2113    Narrative:      Technique:     Standard department protocol CT chest without utilizing nonionic  intravenous contrast.      History: A-fib RVR; I48.92-Unspecified atrial flutter     Comparison: None available     Findings:     No comparison.     No pericardial effusion.  No evidence of thoracic aortic aneurysm.  No lymphadenopathy.  No focal consolidation. Dependent atelectasis.   No pleural effusion.  No pneumothorax.  No fracture.       Impression:      Impression:     No acute abnormality seen.     This report was finalized on 7/10/2024 9:11 PM by Bhavna Blount MD.       XR Chest 1 View [[id number]] Collected: 07/10/24 1825     Updated: 07/10/24 1827    Narrative:      INDICATION: Chest pain.     TECHNIQUE: Frontal radiograph of the chest.     COMPARISON: None.     FINDINGS:   The cardiomediastinal silhouette and pulmonary vasculature appear within  normal limits. No infiltrate, pleural effusion or pneumothorax. No acute  osseous abnormality evident.       Impression:      No acute cardiopulmonary process.        This report was finalized on 7/10/2024 6:25 PM by Alex Pallas, DO.         "     ----------------------------------------------------------------------------------------------------------------------  Assessment and Plan:    Atrial flutter -at time of this dictation, it appears patient has underwent AREN with direct-current cardioversion.  Patient now is in normal sinus rhythm.  Have discontinued IV Cardizem and started metoprolol tartrate 50 mg p.o. twice daily.  Will start patient on Eliquis 5 mg p.o. twice daily.  Will monitor overnight and likely discharge in the next 24 hours.    2.  Essential hypertension -well-controlled    3.  Tobacco abuse -encourage cessation    Disposition likely discharge tomorrow    Eloy Dawkins DO   07/11/24   15:57 EDT

## 2024-07-11 NOTE — ANESTHESIA POSTPROCEDURE EVALUATION
Patient: Santana Dominguez    Procedure Summary       Date: 07/11/24 Room / Location: [facility]    Anesthesia Start: 1152 Anesthesia Stop: 1212    Procedure: CARDIOVERSION EXTERNAL IN CARDIOLOGY DEPARTMENT Diagnosis: (Atrial fibrillation)    Scheduled Providers: Yanick Bryant MD Provider: Ted Elizalde MD    Anesthesia Type: general ASA Status: 3            Anesthesia Type: general    Vitals  Vitals Value Taken Time   /60 07/11/24 1415   Temp 98.2 °F (36.8 °C) 07/11/24 1233   Pulse 75 07/11/24 1431   Resp 16 07/11/24 1230   SpO2 99 % 07/11/24 1431   Vitals shown include unfiled device data.        Anesthesia Post Evaluation

## 2024-07-11 NOTE — H&P
[facility]   HISTORY AND PHYSICAL    Patient Name: Santana Dominguez  : 1970  MRN: [id number]  Primary Care Physician:  Provider, No Known  Date of admission: 7/10/2024    Subjective   Subjective     Chief Complaint: Abnormal EKG at PCP office    History of Present Illness the patient is a 54-year-old male with past medical history significant for hypertension, alcohol use and tobacco use who presents to the emergency department from his primary care provider's office for an abnormal EKG.    Patient was seen and examined in .  Workup in the emergency department revealed tachycardia that was initially thought to be SVT that was unresponsive to Adenocard.  After rate had slowed, patient was noted to be in new onset atrial fibrillation with variable conduction.    Patient states that he saw his PCP today for routine follow-up and medication refill, however, he does report that for the past 1 month he has had several presyncopal and syncopal events that he thinks are at least in part related to change in position.  He also reports that the episodes are associated with dyspnea.  He denies any chest pains, pressure and/or palpitations.  He describes a remote history of some type of cardiac arrhythmia when he was approximately 30 years old.  It sounds as though he was cardioverted at that time but otherwise denies any known cardiac issues apart from hypertension.    Upon further questioning, he reports a 1 week history of intermittent diarrhea.  No abdominal pain, fever, nausea and/or vomiting.  He denies any known sick contacts.  No recent change in diet.    Patient states that he typically drinks 6-8 beers per day and smokes 1 to 1.5 packs of cigarettes daily.    Patient will be admitted to the progressive care unit.  For now, we will plan to continue with the Cardizem infusion.    Review of Systems   Constitutional:  Negative for chills, diaphoresis and fever.   HENT:  Negative for hearing loss and  trouble swallowing.    Eyes:  Negative for discharge and visual disturbance.   Respiratory:  Positive for shortness of breath. Negative for cough and wheezing.    Cardiovascular:  Negative for chest pain, palpitations and leg swelling.   Gastrointestinal:  Positive for diarrhea. Negative for abdominal pain, constipation, nausea and vomiting.   Endocrine: Negative for polydipsia, polyphagia and polyuria.   Genitourinary:  Negative for dysuria, frequency and hematuria.   Musculoskeletal:  Negative for gait problem, myalgias and neck pain.   Skin:  Negative for rash.   Neurological:  Positive for dizziness, syncope and weakness. Negative for tremors, seizures and light-headedness.   Hematological:  Does not bruise/bleed easily.   Psychiatric/Behavioral:  Negative for agitation and confusion.         Personal History     Past Medical History:   Diagnosis Date    Alcohol abuse     Anxiety     HTN (hypertension)     Tobacco abuse        History reviewed. No pertinent surgical history.    Family History: family history is not on file.     Social History:  reports that he has been smoking cigarettes. He started smoking about 37 years ago. He has a 56.3 pack-year smoking history. His smokeless tobacco use includes snuff. He reports current alcohol use of about 70.0 standard drinks of alcohol per week. He reports that he does not use drugs.    Home Medications:       Allergies:  No Known Allergies    Objective    Objective     Vitals:   Temp:  [97.8 °F (36.6 °C)] 97.8 °F (36.6 °C)  Heart Rate:  [] 91  Resp:  [16] 16  BP: ()/() 90/78    Physical Exam  Constitutional:       General: He is not in acute distress.     Appearance: He is well-developed.   HENT:      Head: Normocephalic and atraumatic.   Eyes:      Conjunctiva/sclera: Conjunctivae normal.   Neck:      Trachea: No tracheal deviation.   Cardiovascular:      Rate and Rhythm: Regular rhythm.      Pulses:           Dorsalis pedis pulses are 2+ on the  right side and 2+ on the left side.      Heart sounds: No murmur heard.     No friction rub. No gallop.      Comments: Patient is intermittently tachycardic on my exam  Pulmonary:      Effort: No respiratory distress.      Breath sounds: Normal breath sounds. No wheezing or rales.   Abdominal:      General: Bowel sounds are normal. There is no distension.      Palpations: Abdomen is soft.      Tenderness: There is no abdominal tenderness. There is no guarding.   Musculoskeletal:         General: No tenderness. Normal range of motion.      Right lower leg: No edema.      Left lower leg: No edema.   Skin:     General: Skin is warm and dry.      Findings: No erythema or rash.   Neurological:      Mental Status: He is alert and oriented to person, place, and time.      Cranial Nerves: No cranial nerve deficit.         Result Review    Result Review:  I have personally reviewed the results from the time of this admission to 7/10/2024 23:02 EDT and agree with these findings:  [x]  Laboratory list / accordion  []  Microbiology  []  Radiology  [x]  EKG/Telemetry   []  Cardiology/Vascular   []  Pathology  []  Old records  []  Other:  Most notable findings include:     Results from last 7 days   Lab Units 07/10/24  1803   WBC 10*3/mm3 7.29   HEMOGLOBIN g/dL 17.2   HEMATOCRIT % 47.8   PLATELETS 10*3/mm3 250     Results from last 7 days   Lab Units 07/10/24  1803   SODIUM mmol/L 132*   POTASSIUM mmol/L 3.9   CHLORIDE mmol/L 94*   CO2 mmol/L 22.1   BUN mg/dL 5*   CREATININE mg/dL 0.63*   CALCIUM mg/dL 8.9   BILIRUBIN mg/dL 0.9   ALK PHOS U/L 111   ALT (SGPT) U/L 21   AST (SGOT) U/L 38   GLUCOSE mg/dL 86     CT chest without contrast (images personally reviewed):    Findings:     No comparison.     No pericardial effusion.  No evidence of thoracic aortic aneurysm.  No lymphadenopathy.  No focal consolidation. Dependent atelectasis.   No pleural effusion.  No pneumothorax.  No fracture.     IMPRESSION:  Impression:     No acute  "abnormality seen.    Assessment & Plan   Assessment / Plan     Brief Patient Summary:  Santana Dominguez is a 54 y.o. male who has past medical history significant for hypertension, alcohol use disorder and tobacco use who presents to the emergency department after being told that he had a \"abnormal EKG\" at his primary care provider's office.  Upon arrival to the emergency department, patient was noted to be in new onset atrial flutter with variable conduction.    #New onset atrial flutter  #Reported presyncope and syncope, possibly due to above  #Essential hypertension  #Tobacco use  #Alcohol use disorder  #Generalized anxiety disorder  #GERD  -Patient will be admitted to the progressive care unit for further evaluation and management  -For now, I will continue with the Cardizem infusion that was started in the emergency department  -Patient's current [id number] score would be 1 for hypertension  -However, while awaiting cardiology evaluation, I have started the patient on a heparin infusion with no boluses  -Will check magnesium level and a TSH  -I requested an echocardiogram with bubble study  -Patient will be n.p.o. at midnight while awaiting cardiology evaluation as he may be a candidate for AREN with cardioversion  -Patient was started on LR @ 75 ml/hr  -Will obtain orthostatic vital signs  -Will obtain a bilateral carotid ultrasound and CT head without contrast  -Start the patient on CIWA and as needed Ativan if needed  -Patient denies previous known history of alcohol withdrawal seizures  -I have ordered a nicotine patch  -Repeat CBC and chemistry panel in a.m.    VTE Prophylaxis:  Heparin infusion    CODE STATUS:    Code Status (Patient has no pulse and is not breathing): CPR (Attempt to Resuscitate)  Medical Interventions (Patient has pulse or is breathing): Full Support    Admission Status:  I believe this patient meets inpatient status.    Ghislaine Vaca, DO  "

## 2024-07-11 NOTE — ANESTHESIA PREPROCEDURE EVALUATION
Anesthesia Evaluation     Patient summary reviewed and Nursing notes reviewed                Airway   Mallampati: I  Dental      Pulmonary    (+) ,decreased breath sounds  Cardiovascular     ECG reviewed  PT is on anticoagulation therapy  Rhythm: irregular  Rate: abnormal    (+) hypertension, dysrhythmias      Neuro/Psych  GI/Hepatic/Renal/Endo      Musculoskeletal     Abdominal    Substance History      OB/GYN          Other                    Anesthesia Plan    ASA 3     general   total IV anesthesia    Anesthetic plan, risks, benefits, and alternatives have been provided, discussed and informed consent has been obtained with: patient.    Use of blood products discussed with patient .      CODE STATUS:    Code Status (Patient has no pulse and is not breathing): CPR (Attempt to Resuscitate)  Medical Interventions (Patient has pulse or is breathing): Full Support

## 2024-07-11 NOTE — DISCHARGE INSTRUCTIONS
Outpatient Order has already been placed for a Cardiac Event Monitor through the General Cardiology's office at [place], [place].     Patient is to go by this office post-discharge to have the Cardiac Event Monitor placed on him before 2:45 PM if at all possible please.      If they are not able to be there by 2:45 PM, they can call the office to see if someone is there to put the monitor on him or go to the office on the next business day if the office is open. (example: weekends or holidays)    Office phone number is [phone] for any other questions or concerns.

## 2024-07-11 NOTE — CONSULTS
[facility]  CONSULT  NOTE      Patient information:  Date of Admit: 7/10/2024  Date of Consult: 07/11/24  Hospitalist/Referring MD:Ghislaine Vaca DO;   PCP: Provider, No Known  MRN:  [id number]  Visit Number:  [id number]    LOS: 1  CODE STATUS:  Code Status and Medical Interventions:   Ordered at: 07/10/24 2038     Code Status (Patient has no pulse and is not breathing):    CPR (Attempt to Resuscitate)     Medical Interventions (Patient has pulse or is breathing):    Full Support       PROBLEM LIST: Principal Problem:    Atrial flutter      Inpatient Cardiology Consult  Consult performed by: Sakina Colunga APRN  Consult ordered by: Ghislaine Vaca DO        08:52 EDT  7/11/2024    Reason for Cardiology consultation: New onset atrial flutter variable conduction    General Cardiology Consulting Physician: Dr. Yanick Bryant MD    Primary Cardiologist: None found in patient's chart      Assessment    Paroxysmal atrial flutter [patient had cardioversion for atrial flutter almost 20 years ago per patient], [id number] score of 1  Syncopal episodes possibly due to alcohol excess plus atrial flutter with RVR  Essential hypertension  Ongoing tobacco abuse  Ongoing alcohol abuse          Planning   1.  Discussed with patient AREN cardioversion he patient would require to be anticoagulated for at least a month, NOAC is about $15 a month would start him on Eliquis 5 mg p.o. twice daily  2.  Strongly advised to quit alcohol intake  3.  Currently strongly advised to quit tobacco use  4.  Consider outpatient monitoring for assessment of the atrial flutter burden and possible consideration of ablation if the patient abstains from alcohol          Subjective Data     7/11/2024    ADMISSION INFORMATION:  Chief Complaint   Patient presents with    Abnormal ECG     History of Present Illness    Santana Dominguez is a 54 y.o. male with a past medical history significant for  hypertension, anxiety, current tobacco abuse with cigarettes and smokeless tobacco (1.5 PPD of cigarettes for 37+ years and dips snuff as well), alcohol abuse reporting 6-8 beers per week.     Patient presented to [facility] emergency room (ER) on 7/10/2024 with complaints of abnormal EKG from PCPs office.      While in the ER, patient reported that he was at his PCP for routine follow-up.  He reports in the last month he has had several near syncopal and syncopal events that he thinks was related to changing in his position with associated dyspnea.  He denied any chest pain, pressure, or palpitations.  He did describe a remote history of some type of cardiac arrhythmia when he was approximately 30 years ago in which she describes he was cardioverted at that time.  Otherwise he denies any cardiac issues other than hypertension.  Upon further questioning, patient reported 1 week history of intermittent diarrhea.  He denied any abdominal pain, fever, nausea, or vomiting.  He denied any known sick contacts or recent change in his diet.    Initial EKG was thought to be SVT but was unresponsive to Adenocard.  After rate was slowed down.  It was noted patient was in new-onset atrial fibrillation with variable conduction.  Patient was placed on IV Cardizem    Cardiology has been consulted for further evaluation and management for new onset atrial flutter variable conduction.     Primary Cardiologist: None found in patient's chart    Atrial flutter with improved rate control in the 90-100s with variable conduction and a 3 beat run of V. Tach versus AB Conduction as seen in telemetry strips attached below. Patient has been held n.p.o. since midnight for any potential testing or procedures for today.    Patient was in room  when he was seen and examined by Dr. Bryant.  Patient is lying in bed resting quietly.  No acute distress noted at this time.  Patient reports approximately a month ago he started  having dizzy spells that would cause him to pass out and fall.  He did report associated shortness of breath intermittently.  Denies any chest pain.  Patient reports only medical history has hypertension other than a irregular heart rhythm when he was approximately in his 30s that he does describe being cardioverted with.  States that he has not been bothered by any arrhythmias until now since then.  Patient does admit to drinking 6-12 drinks every other day at least.  He also admits to smoking 1.5 PPD.  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  Discussed further evaluation with AREN and external cardioversion and patient has agreed to proceed with this later today.  Patient has been n.p.o. since midnight.    ORDERS:  AREN guided external cardioversion for today.    Known medications given enroute via EMS and in the ER:       Personal History     Cardiac risk factors:hypertension, smoking, and male, age, ETOH abuse      Last Echo:  None found in patient's chart.      Last Stress: None found in patient's chart.      Last Cath:  None found in patient's chart.                         Past Medical History:   Diagnosis Date    Alcohol abuse     Anxiety     HTN (hypertension)     Tobacco abuse      History reviewed. No pertinent surgical history.  History reviewed. No pertinent family history.  Social History     Tobacco Use    Smoking status: Every Day     Current packs/day: 1.50     Average packs/day: 1.5 packs/day for 37.5 years (56.3 ttl pk-yrs)     Types: Cigarettes     Start date: 1987    Smokeless tobacco: Current     Types: Snuff   Vaping Use    Vaping status: Never Used   Substance Use Topics    Alcohol use: Yes     Alcohol/week: 70.0 standard drinks of alcohol     Types: 70 Cans of beer per week     Comment: 6-8 beers/week    Drug use: Never     ALLERGIES: Patient has no known allergies.    Medications listed below are reported home medications pulling from within  the system:  Medications Prior to Admission   Medication Sig Dispense Refill Last Dose    metoprolol tartrate (LOPRESSOR) 50 MG tablet Take 1 tablet by mouth 2 (Two) Times a Day.   7/10/2024    pantoprazole (PROTONIX) 20 MG EC tablet Take 2 tablets by mouth Daily.   7/10/2024       Review of Systems   Constitutional:  Negative for activity change, appetite change, chills, diaphoresis, fever and unexpected weight change.   HENT:  Negative for facial swelling and trouble swallowing.    Eyes:  Negative for visual disturbance.   Respiratory:  Positive for shortness of breath. Negative for apnea, cough, chest tightness, wheezing and stridor.    Cardiovascular:  Negative for chest pain, palpitations and leg swelling.   Gastrointestinal:  Negative for abdominal distention, abdominal pain, diarrhea, nausea and vomiting.   Endocrine: Negative.    Genitourinary: Negative.    Musculoskeletal: Negative.    Skin:  Negative for color change.   Neurological:  Positive for dizziness and syncope. Negative for speech difficulty and weakness.        Syncopal episodes causing him to fall.    Psychiatric/Behavioral:  Negative for agitation and behavioral problems.      Objective Data   Vital Signs  Temp:  [97.8 °F (36.6 °C)-98.1 °F (36.7 °C)] 97.8 °F (36.6 °C)  Heart Rate:  [] 111  Resp:  [15-20] 18  BP: ()/() 113/73  Device (Oxygen Therapy): room air  Vital Signs (last 72 hrs)         07/08 0700  07/09 0659 07/09 0700  07/10 0659 07/10 0700  07/11 0659 07/11 0700  07/11 0852   Most Recent      Temp (°F)     97.8 -  98.1       98.1 (36.7) 07/11 0400    Heart Rate     51 -  165      117     117 07/11 0700    Resp     15 -  20       18 07/11 0545    BP     77/60 -  132/107      110/88     110/88 07/11 0700    SpO2 (%)     96 -  100      100     100 07/11 0700          BMI:   Body mass index is 17.79 kg/m².  WEIGHT:  Wt Readings from Last 3 Encounters:   07/11/24 59.5 kg (131 lb 2.8 oz)     DIET:  NPO Diet NPO Type:  "Strict NPO  I&O:  Intake & Output (last 3 days)         07/08 0701 07/09 0700 07/09 0701  07/10 0700 07/10 0701 07/11 0700 07/11 0701 07/12 0700    I.V. (mL/kg)   558.1 (9.8)     Total Intake(mL/kg)   558.1 (9.8)     Urine (mL/kg/hr)   450     Total Output   450     Net   +108.1                   Physical Exam  Constitutional:       General: He is not in acute distress.     Appearance: Normal appearance. He is not ill-appearing, toxic-appearing or diaphoretic.   HENT:      Head: Normocephalic and atraumatic.      Nose: Nose normal.      Mouth/Throat:      Mouth: Mucous membranes are moist.   Eyes:      Extraocular Movements: Extraocular movements intact.      Pupils: Pupils are equal, round, and reactive to light.   Cardiovascular:      Rate and Rhythm: Tachycardia present. Rhythm irregular.      Heart sounds: Normal heart sounds.      Comments: Slightly tachycardic 90-100s  Pulmonary:      Effort: Pulmonary effort is normal.      Breath sounds: Normal breath sounds.   Abdominal:      General: Bowel sounds are normal.      Palpations: Abdomen is soft.   Musculoskeletal:         General: Normal range of motion.      Cervical back: Normal range of motion.   Skin:     General: Skin is warm and dry.   Neurological:      General: No focal deficit present.      Mental Status: He is alert and oriented to person, place, and time. Mental status is at baseline.   Psychiatric:         Mood and Affect: Mood normal.         Behavior: Behavior normal.         Thought Content: Thought content normal.         Judgment: Judgment normal.       Results review   Results Review:    I have reviewed the patient's new clinical results. 07/11/24 11:13 EDT    Results from last 7 days   Lab Units 07/10/24  2105 07/10/24  1803   HSTROP T ng/L 7 9     No results found for: \"PROBNP\"  Results from last 7 days   Lab Units 07/10/24  2302 07/10/24  1803   WBC 10*3/mm3 6.05 7.29   HEMOGLOBIN g/dL 15.2 17.2   PLATELETS 10*3/mm3 232 250     Results " "from last 7 days   Lab Units 07/10/24  2302 07/10/24  1803   SODIUM mmol/L 137 132*   POTASSIUM mmol/L 3.6 3.9   CHLORIDE mmol/L 101 94*   CO2 mmol/L 17.1* 22.1   BUN mg/dL 4* 5*   CREATININE mg/dL 0.51* 0.63*   CALCIUM mg/dL 8.1* 8.9   GLUCOSE mg/dL 71 86   ALT (SGPT) U/L 18 21   AST (SGOT) U/L 33 38     Lab Results   Component Value Date    MG 1.6 07/10/2024     No results found for: \"CHOL\", \"TRIG\", \"HDL\", \"LDL\"  Estimated Creatinine Clearance: 139.4 mL/min (A) (by C-G formula based on SCr of 0.51 mg/dL (L)).  No results found for: \"HGBA1C\"  Lab Results   Component Value Date    INR 1.14 (H) 07/10/2024     Lab Results   Component Value Date    LABHEPA 0.14 (L) 2024    LABHEPA <0.10 (L) 07/10/2024     No components found for: \"DIG\"  Lab Results   Component Value Date    TSH 1.920 07/10/2024      No results found for: \"URICACID\"  Pain Management Panel          Latest Ref Rng & Units 7/10/2024   Pain Management Panel   Amphetamine, Urine Qual Negative Negative    Barbiturates Screen, Urine Negative Negative    Benzodiazepine Screen, Urine Negative Negative    Buprenorphine, Screen, Urine Negative Negative    Cocaine Screen, Urine Negative Negative    Fentanyl, Urine Negative Negative    Methadone Screen , Urine Negative Negative    Methamphetamine, Ur Negative Negative      Microbiology Results (last 10 days)       ** No results found for the last 240 hours. **           No results found for: \"BLOODCX\"      EC2024    07/10/2024              ECG/EMG Results (last 24 hours)       Procedure Component Value Units Date/Time    ECG 12 Lead Tachycardia [[id number]] Collected: 07/10/24 1750     Updated: 07/10/24 1752     QT Interval 316 ms      QTC Interval 509 ms     Narrative:      Test Reason : Tachycardia  Blood Pressure :   */*   mmHG  Vent. Rate : 156 BPM     Atrial Rate : 156 BPM     P-R Int : 138 ms          QRS Dur :  70 ms      QT Int : 316 ms       P-R-T Axes :   *  83  84 degrees     QTc Int : 509 " ms    ** Critical Test Result: High HR  Sinus tachycardia with frequent premature ventricular complexes  ST & T wave abnormality, consider inferior ischemia  Abnormal ECG  When compared with ECG of 10-JUL-2024 17:38, (Unconfirmed)  premature supraventricular complexes are no longer present  ST elevation now present in Inferior leads    Referred By: CURD           Confirmed By:     ECG 12 Lead Tachycardia [[id number]] Collected: 07/10/24 1738     Updated: 07/10/24 1911     QT Interval 306 ms      QTC Interval 528 ms     Narrative:      Test Reason : Tachycardia  Blood Pressure :   */*   mmHG  Vent. Rate : 179 BPM     Atrial Rate : 258 BPM     P-R Int :   * ms          QRS Dur :  82 ms      QT Int : 306 ms       P-R-T Axes :   *  77  68 degrees     QTc Int : 528 ms    ** Critical Test Result: High HR  Supraventricular tachycardia with premature supraventricular complexes and with frequent premature ventricular complexes  ST & T wave abnormality, consider inferior ischemia  Abnormal ECG  No previous ECGs available    Referred By: CURD           Confirmed By:     Telemetry Scan [[id number]] Resulted: 07/10/24     Updated: 07/11/24 0408    Telemetry Scan [[id number]] Resulted: 07/10/24     Updated: 07/11/24 0408    Telemetry Scan [[id number]] Resulted: 07/10/24     Updated: 07/11/24 0510    ECG 12 Lead Rhythm Change [[id number]] Collected: 07/11/24 0613     Updated: 07/11/24 0613     QT Interval 332 ms      QTC Interval 453 ms     Narrative:      Test Reason : Rhythm Change  Blood Pressure :   */*   mmHG  Vent. Rate : 112 BPM     Atrial Rate : 288 BPM     P-R Int :   * ms          QRS Dur :  76 ms      QT Int : 332 ms       P-R-T Axes : 262  69  68 degrees     QTc Int : 453 ms    Atrial flutter with variable AV block with premature ventricular or aberrantly conducted complexes  Nonspecific ST and T wave abnormality  Abnormal ECG  When compared with ECG of 10-JUL-2024 17:50, (Unconfirmed)  Atrial flutter has replaced Sinus  rhythm  ST no longer elevated in Inferior leads    Referred By: LUI           Confirmed By:             TELEMETRY:   Atrial flutter with improved rate control in the 90s with variable conduction and a 3 beat run of V. tach versus AB Conduction as seen in telemetry strips attached below.                    Atrial flutter 130s        RADIOLOGY STUDIES:  Imaging Results (Last 72 Hours)       Procedure Component Value Units Date/Time    CT Chest Without Contrast Diagnostic [[id number]] Collected: 07/10/24 2110     Updated: 07/10/24 2113    Narrative:      Technique:     Standard department protocol CT chest without utilizing nonionic  intravenous contrast.      History: A-fib RVR; I48.92-Unspecified atrial flutter     Comparison: None available     Findings:     No comparison.     No pericardial effusion.  No evidence of thoracic aortic aneurysm.  No lymphadenopathy.  No focal consolidation. Dependent atelectasis.   No pleural effusion.  No pneumothorax.  No fracture.       Impression:      Impression:     No acute abnormality seen.     This report was finalized on 7/10/2024 9:11 PM by Bhavna Blount MD.       XR Chest 1 View [[id number]] Collected: 07/10/24 1825     Updated: 07/10/24 1827    Narrative:      INDICATION: Chest pain.     TECHNIQUE: Frontal radiograph of the chest.     COMPARISON: None.     FINDINGS:   The cardiomediastinal silhouette and pulmonary vasculature appear within  normal limits. No infiltrate, pleural effusion or pneumothorax. No acute  osseous abnormality evident.       Impression:      No acute cardiopulmonary process.        This report was finalized on 7/10/2024 6:25 PM by Alex Pallas, DO.               ALLERGIES: Patient has no known allergies.    CURRENT MEDICATIONS:  Current list of medications may not reflect those currently placed in orders that are not signed or are being held.     nicotine, 1 patch, Transdermal, Q24H  sodium chloride, 10 mL, Intravenous, Q12H      dilTIAZem, 5-15  mg/hr, Last Rate: 10 mg/hr (07/11/24 9963)  heparin, 14 Units/kg/hr, Last Rate: 14 Units/kg/hr (07/11/24 0628)  lactated ringers, 75 mL/hr, Last Rate: 75 mL/hr (07/10/24 2000)  Pharmacy to Dose Heparin,         senna-docusate sodium **AND** polyethylene glycol **AND** bisacodyl **AND** bisacodyl    LORazepam    Pharmacy to Dose Heparin    [COMPLETED] Insert Peripheral IV **AND** sodium chloride    sodium chloride    sodium chloride        Thank you very much for asking us to be involved in this patient's care.  We will follow along with you. Please do not hesitate to call for any questions or concerns.     I have discussed the patients findings and recommendations with the patient.    Electronically signed by COY Barajas, 07/11/24, 11:19 AM EDT.   Electronically signed by Yanick Bryant MD, 07/11/24, 11:28 AM EDT.                        Please note that portions of this note were copied and has been reviewed and is accurate as of 7/11/2024 .      Please note that portions of this note were completed with a voice recognition program.

## 2024-07-11 NOTE — PLAN OF CARE
Problem: Adult Inpatient Plan of Care  Goal: Plan of Care Review  7/11/2024 1408 by Karolina Riley RN  Outcome: Ongoing, Progressing  Flowsheets  Taken 7/11/2024 1408 by Karolina Riley RN  Progress: improving  Taken 7/11/2024 0442 by Debbie Max RN  Plan of Care Reviewed With: patient  7/11/2024 1408 by Karolina Riley RN  Outcome: Ongoing, Progressing  Flowsheets (Taken 7/11/2024 1408)  Progress: improving  Goal: Patient-Specific Goal (Individualized)  7/11/2024 1408 by Karolina Riley RN  Outcome: Ongoing, Progressing  7/11/2024 1408 by Karolina Riley RN  Outcome: Ongoing, Progressing  Goal: Absence of Hospital-Acquired Illness or Injury  7/11/2024 1408 by Karolina Riley RN  Outcome: Ongoing, Progressing  7/11/2024 1408 by Karolina Riley RN  Outcome: Ongoing, Progressing  Intervention: Identify and Manage Fall Risk  Recent Flowsheet Documentation  Taken 7/11/2024 1300 by Karolina Riley RN  Safety Promotion/Fall Prevention:   activity supervised   assistive device/personal items within reach   clutter free environment maintained   fall prevention program maintained   nonskid shoes/slippers when out of bed   room organization consistent   safety round/check completed  Taken 7/11/2024 1100 by Karolina Riley RN  Safety Promotion/Fall Prevention:   activity supervised   assistive device/personal items within reach   clutter free environment maintained   fall prevention program maintained   nonskid shoes/slippers when out of bed   room organization consistent   safety round/check completed  Taken 7/11/2024 0900 by Karolina Riley RN  Safety Promotion/Fall Prevention:   activity supervised   assistive device/personal items within reach   clutter free environment maintained   fall prevention program maintained   nonskid shoes/slippers when out of bed   room organization consistent   safety round/check completed  Taken 7/11/2024 0700 by Karolina Riley RN  Safety Promotion/Fall  Prevention:   activity supervised   assistive device/personal items within reach   clutter free environment maintained   fall prevention program maintained   nonskid shoes/slippers when out of bed   room organization consistent   safety round/check completed  Intervention: Prevent and Manage VTE (Venous Thromboembolism) Risk  Recent Flowsheet Documentation  Taken 7/11/2024 1400 by Karolina Riley RN  VTE Prevention/Management: (Heparin gtt) other (see comments)  Taken 7/11/2024 0830 by Karolina Riley RN  VTE Prevention/Management: (Heparin gtt) other (see comments)  Intervention: Prevent Infection  Recent Flowsheet Documentation  Taken 7/11/2024 1300 by Karolina Riley RN  Infection Prevention: rest/sleep promoted  Taken 7/11/2024 1100 by Karolina Riley RN  Infection Prevention: rest/sleep promoted  Taken 7/11/2024 0900 by Karolina Riley RN  Infection Prevention: rest/sleep promoted  Taken 7/11/2024 0700 by Karolina Riley RN  Infection Prevention: rest/sleep promoted  Goal: Optimal Comfort and Wellbeing  7/11/2024 1408 by Karolina Riley RN  Outcome: Ongoing, Progressing  7/11/2024 1408 by Karolina Riley RN  Outcome: Ongoing, Progressing  Intervention: Provide Person-Centered Care  Recent Flowsheet Documentation  Taken 7/11/2024 1400 by Karolina Riley RN  Trust Relationship/Rapport:   care explained   choices provided   thoughts/feelings acknowledged  Taken 7/11/2024 0830 by Karolina Riley RN  Trust Relationship/Rapport:   care explained   choices provided   thoughts/feelings acknowledged  Goal: Readiness for Transition of Care  7/11/2024 1408 by Karolina Riley RN  Outcome: Ongoing, Progressing  7/11/2024 1408 by Karolina Riley RN  Outcome: Ongoing, Progressing     Problem: Behavioral Health Comorbidity  Goal: Maintenance of Behavioral Health Symptom Control  7/11/2024 1408 by Karolina Riley RN  Outcome: Ongoing, Progressing  7/11/2024 1408 by Karolina Riley  RN  Outcome: Ongoing, Progressing     Problem: Dysrhythmia  Goal: Normalized Cardiac Rhythm  Outcome: Ongoing, Progressing  Intervention: Monitor and Manage Cardiac Rhythm Effect  Recent Flowsheet Documentation  Taken 7/11/2024 1400 by Karolina Riley RN  VTE Prevention/Management: (Heparin gtt) other (see comments)  Taken 7/11/2024 0830 by Karolina Riley RN  VTE Prevention/Management: (Heparin gtt) other (see comments)   Goal Outcome Evaluation:           Progress: improving

## 2024-07-12 ENCOUNTER — TELEPHONE (OUTPATIENT)
Dept: CARDIOLOGY | Facility: CLINIC | Age: 54
End: 2024-07-12

## 2024-07-12 VITALS
DIASTOLIC BLOOD PRESSURE: 83 MMHG | RESPIRATION RATE: 20 BRPM | OXYGEN SATURATION: 96 % | TEMPERATURE: 98.3 F | BODY MASS INDEX: 18.6 KG/M2 | HEIGHT: 72 IN | SYSTOLIC BLOOD PRESSURE: 116 MMHG | HEART RATE: 77 BPM | WEIGHT: 137.35 LBS

## 2024-07-12 PROBLEM — I48.92 ATRIAL FLUTTER: Status: RESOLVED | Noted: 2024-07-10 | Resolved: 2024-07-12

## 2024-07-12 LAB
ANION GAP SERPL CALCULATED.3IONS-SCNC: 8.3 MMOL/L (ref 5–15)
BUN SERPL-MCNC: 6 MG/DL (ref 6–20)
BUN/CREAT SERPL: 10.2 (ref 7–25)
CALCIUM SPEC-SCNC: 8.2 MG/DL (ref 8.6–10.5)
CHLORIDE SERPL-SCNC: 97 MMOL/L (ref 98–107)
CO2 SERPL-SCNC: 24.7 MMOL/L (ref 22–29)
CREAT SERPL-MCNC: 0.59 MG/DL (ref 0.76–1.27)
EGFRCR SERPLBLD CKD-EPI 2021: 115.3 ML/MIN/1.73
GLUCOSE SERPL-MCNC: 95 MG/DL (ref 65–99)
MAGNESIUM SERPL-MCNC: 1.5 MG/DL (ref 1.6–2.6)
POTASSIUM SERPL-SCNC: 4.3 MMOL/L (ref 3.5–5.2)
SODIUM SERPL-SCNC: 130 MMOL/L (ref 136–145)

## 2024-07-12 PROCEDURE — 99232 SBSQ HOSP IP/OBS MODERATE 35: CPT | Performed by: SPECIALIST

## 2024-07-12 PROCEDURE — 83735 ASSAY OF MAGNESIUM: CPT | Performed by: INTERNAL MEDICINE

## 2024-07-12 PROCEDURE — 93880 EXTRACRANIAL BILAT STUDY: CPT | Performed by: RADIOLOGY

## 2024-07-12 PROCEDURE — 99239 HOSP IP/OBS DSCHRG MGMT >30: CPT | Performed by: INTERNAL MEDICINE

## 2024-07-12 PROCEDURE — 80048 BASIC METABOLIC PNL TOTAL CA: CPT | Performed by: INTERNAL MEDICINE

## 2024-07-12 RX ORDER — PANTOPRAZOLE SODIUM 40 MG/1
40 TABLET, DELAYED RELEASE ORAL
Status: DISCONTINUED | OUTPATIENT
Start: 2024-07-12 | End: 2024-07-12 | Stop reason: HOSPADM

## 2024-07-12 RX ORDER — METOPROLOL TARTRATE 50 MG/1
50 TABLET, FILM COATED ORAL 2 TIMES DAILY
Qty: 60 TABLET | Refills: 1 | Status: SHIPPED | OUTPATIENT
Start: 2024-07-12

## 2024-07-12 RX ORDER — SODIUM CHLORIDE 0.9 % (FLUSH) 0.9 %
10 SYRINGE (ML) INJECTION EVERY 12 HOURS SCHEDULED
Status: DISCONTINUED | OUTPATIENT
Start: 2024-07-12 | End: 2024-07-12 | Stop reason: HOSPADM

## 2024-07-12 RX ORDER — SODIUM CHLORIDE 9 MG/ML
40 INJECTION, SOLUTION INTRAVENOUS AS NEEDED
Status: DISCONTINUED | OUTPATIENT
Start: 2024-07-12 | End: 2024-07-12 | Stop reason: HOSPADM

## 2024-07-12 RX ORDER — PANTOPRAZOLE SODIUM 20 MG/1
40 TABLET, DELAYED RELEASE ORAL DAILY
Qty: 30 TABLET | Refills: 1 | Status: SHIPPED | OUTPATIENT
Start: 2024-07-12

## 2024-07-12 RX ORDER — METOPROLOL TARTRATE 50 MG/1
50 TABLET, FILM COATED ORAL 2 TIMES DAILY
Status: DISCONTINUED | OUTPATIENT
Start: 2024-07-12 | End: 2024-07-12 | Stop reason: HOSPADM

## 2024-07-12 RX ORDER — PANTOPRAZOLE SODIUM 20 MG/1
40 TABLET, DELAYED RELEASE ORAL DAILY
Qty: 30 TABLET | Refills: 1 | Status: SHIPPED | OUTPATIENT
Start: 2024-07-12 | End: 2024-07-12

## 2024-07-12 RX ORDER — METOPROLOL TARTRATE 50 MG/1
50 TABLET, FILM COATED ORAL 2 TIMES DAILY
Qty: 60 TABLET | Refills: 1 | Status: SHIPPED | OUTPATIENT
Start: 2024-07-12 | End: 2024-07-12

## 2024-07-12 RX ORDER — SODIUM CHLORIDE 0.9 % (FLUSH) 0.9 %
10 SYRINGE (ML) INJECTION AS NEEDED
Status: DISCONTINUED | OUTPATIENT
Start: 2024-07-12 | End: 2024-07-12 | Stop reason: HOSPADM

## 2024-07-12 RX ADMIN — METOPROLOL TARTRATE 50 MG: 50 TABLET, FILM COATED ORAL at 08:29

## 2024-07-12 RX ADMIN — APIXABAN 5 MG: 5 TABLET, FILM COATED ORAL at 08:29

## 2024-07-12 RX ADMIN — Medication 10 ML: at 08:30

## 2024-07-12 RX ADMIN — PANTOPRAZOLE SODIUM 40 MG: 40 TABLET, DELAYED RELEASE ORAL at 08:29

## 2024-07-12 NOTE — CASE MANAGEMENT/SOCIAL WORK
Case Management Discharge Note      Final Note: Patient is being discharged home on this date 7/12/24.  No needs identified.         Selected Continued Care - Admitted Since 7/10/2024           Final Discharge Disposition Code: 01 - home or self-care

## 2024-07-12 NOTE — PLAN OF CARE
Goal Outcome Evaluation:  Plan of Care Reviewed With: patient        Progress: improving  Outcome Evaluation: VSS. Pt A&Ox4. RA. No complaints of pain or discomfort. Call light within reach, bed alarm on.

## 2024-07-12 NOTE — PHARMACY PATIENT ASSISTANCE
Pharmacy checked on cost of new med Eliquis. Per patient's insurance, it is covered with a $15 copay.    Thank you,  Altagracia Sy, PharmD

## 2024-07-12 NOTE — DISCHARGE SUMMARY
[facility] HOSPITALIST MEDICINE DISCHARGE SUMMARY    Patient Identification:  Name:  Santana Dominguez  Age:  54 y.o.  Sex:  male  :  1970  MRN:  [id number]  Visit Number:  [id number]    Date of Admission: 7/10/2024  Date of Discharge: 2024    PCP: Provider, No Known    DISCHARGE DIAGNOSIS   Atrial flutter  Essential hypertension  Tobacco abuse      CONSULTS  Dr. Bryant, Cardiology      PROCEDURES PERFORMED   Patient did undergo direct-current cardioversion with AREN on 2024 secondary to atrial flutter.  Please see procedure note for specific details.  Patient tolerated the procedure well with successful cardioversion.      HOSPITAL COURSE  Mr. Dominguez is a 54 y.o. male who presented to [facility] ED on 7/10/2024 with a chief complaint of abnormal EKG.  Patient has a past medical history remarkable for essential hypertension, alcohol use and tobacco use.  Patient reports that he went to his primary care provider on date of admission for routine follow-up with medication refill.  However, while he was there he did report having presyncopal episodes and several syncopal episodes a month prior to this appointment.  Patient did have EKG obtained in his primary care provider's office which did demonstrate patient to be in atrial flutter.  For this reason, patient was instructed to come to the emergency department for further treatment and evaluation.  Initial evaluation in the emergency department did consist of basic laboratory work as well as physical exam and vital signs.  Please see initial H&P for specific details.  While in the emergency department, EKG was obtained which found patient to be tachycardic with findings concerning for SVT.  Patient was given Identicard with no response.  As such, patient was started on Cardizem drip and then admitted to our progressive care unit for further treatment and evaluation.    Patient did maintain rate control while on Cardizem but  remained in atrial flutter.  As such, cardiology services were consulted who thoroughly evaluated the patient.  After thorough evaluation, recommendation was made to obtain transthoracic echocardiogram.  Transthoracic echo obtained on 7/11/2024 demonstrates normal left ventricular systolic function with estimated EF 55 to 60%.  Saline test did demonstrate a right to left atrial level shunt.  With this in mind, recommendation was made to proceed with AREN with direct-current cardioversion on 7/11/2024.  Please see above for specific details.  Patient did undergo successful direct-current cardioversion on 7/11/2024 and remains in normal sinus rhythm at time of this dictation.  Cardiology has recommended patient be discharged home on metoprolol 50 mg p.o. twice daily and to continue anticoagulation for at least 1 month.  Patient has had outpatient event monitor arranged upon discharge and patient has been instructed to follow-up with cardiology services within 1 to 2 weeks of discharge.  With this in mind, it is felt patient has reached maximum medical benefit of current hospitalization and will be discharged home in stable condition today.  The beforementioned plan was thoroughly discussed with the patient and he expressed his understanding and willingness to proceed with the beforementioned plan.    VITAL SIGNS:      07/10/24  1736 07/11/24  0400 07/12/24  0400   Weight: 56.7 kg (125 lb) 59.5 kg (131 lb 2.8 oz) 62.3 kg (137 lb 5.6 oz)     Body mass index is 18.63 kg/m².    PHYSICAL EXAM:  Constitutional: Well-nourished  male in no apparent distress.     HENT:  Head:  Normocephalic and atraumatic.  Mouth:  Moist mucous membranes.    Eyes:  Conjunctivae and EOM are normal.  Pupils are equal, round, and reactive to light.  No scleral icterus.    Neck:  Neck supple. No thyromegaly.  No JVD present.    Cardiovascular: Regular rate and rhythm with no murmurs, rubs or clicks appreciated  Pulmonary/Chest:  Clear to  auscultation bilaterally with no crackles, wheezes or rhonchi appreciated.  Abdominal:  Soft. Nontender. Nondistended  Bowel sounds are normal in all four quadrants. No organomegally appreciated.   Musculoskeletal:  5/5 muscle strength bilateral upper and lower extremities with equal muscle tone and bulk. No edema, no tenderness, and no deformity.  No red or swollen joints anywhere.    Neurological:  Alert, Cranial nerves II-XII intact with no focal deficits.  No facial droop.  No slurred speech.   Skin:  Warm and dry to palpation with no rashes or lesions appreciated.  Peripheral vascular:  2+ radial and pedal pulses in bilateral upper and lower extremities.  Psychiatric:  Alert and oriented x3, demonstrates appropriate judgment and insight.    DISCHARGE DISPOSITION   Stable    DISCHARGE MEDICATIONS:     Discharge Medications        New Medications        Instructions Start Date   Eliquis 5 MG tablet tablet  Generic drug: apixaban   5 mg, Oral, Every 12 Hours Scheduled             Continue These Medications        Instructions Start Date   metoprolol tartrate 50 MG tablet  Commonly known as: LOPRESSOR   50 mg, Oral, 2 Times Daily      pantoprazole 20 MG EC tablet  Commonly known as: PROTONIX   40 mg, Oral, Daily                 Your Scheduled Appointments      Aug 07, 2024  1:00 PM  Follow Up with COY Byrd  [facility] ([place]) [address] [facility] [address] [phone]   -Bring photo ID, insurance card, and list of medications to appointment  -If testing was completed outside of [facility] then patient must bring images on a disc  -Copay will be collected at time of appointment  -Established patients should arrive 10 minutes prior to appointment       Additional instructions:    Cardiology will send a request to scheduling for appt, then they'll call PT with appt    Schedule PCP with Ramón Lyons on 7/18/24 @ 11:15 am, gave appt card            Additional Instructions for the Follow-ups that You Need to Schedule       Discharge Follow-up with PCP   As directed       Currently Documented PCP:    Provider, No Known    PCP Phone Number:    [phone]     Follow Up Details: please follow up with pcp in 1 week        Discharge Follow-up with Specialty: Cardiology; 2 Weeks   As directed      Specialty: Cardiology   Follow Up: 2 Weeks   Follow Up Details: atrial flutter s/p cardioversion               Follow-up Information       Margarita Loyd APRN Follow up in 3 week(s).    Specialty: Cardiology  Contact information:  [address] [place] [address]  [phone]               Ramón Lyons MD Follow up in 6 day(s).    Specialty: Family Medicine  Contact information:  [address] [facility] [address]  [phone]                             TEST  RESULTS PENDING AT DISCHARGE       Eloy Dawkins DO  07/12/24  12:48 EDT    Please note that this discharge summary required more than 30 minutes to complete.    Please send a copy of this dictation to the following providers:  Provider, No Known

## 2024-07-12 NOTE — PROGRESS NOTES
[facility]  PROGRESS NOTE    Patient information:  Name: Santana Dominguez  Age/Sex: 54 y.o. male  :  1970        PCP: Provider, No Known  Attending: Ghislaine Vaca DO  MRN:  [id number]  Visit Number:  [id number]    LOS: 2  CODE STATUS:    Code Status and Medical Interventions:   Ordered at: 07/10/24 2038     Code Status (Patient has no pulse and is not breathing):    CPR (Attempt to Resuscitate)     Medical Interventions (Patient has pulse or is breathing):    Full Support       PROBLEM LIST:Active Problems:    Moderate malnutrition    Reason for Cardiology follow-up: New onset atrial flutter variable conduction health SR    Subjective   ADMISSION INFORMATION:  Chief Complaint   Patient presents with    Abnormal ECG     DATE:2024    HPI:  Santana Dominguez is a 54 y.o. male with a past medical history significant for hypertension, anxiety, current tobacco abuse with cigarettes and smokeless tobacco (1.5 PPD of cigarettes for 37+ years and dips snuff as well), alcohol abuse reporting 6-8 beers per week.      Patient presented to [facility] ([facility] emergency room (ER) on 7/10/2024 with complaints of abnormal EKG from PCPs office.     Primary Cardiologist: None found in patient's chart     Interval History:   Telemetry reveals SR 70-80s with occasional PVCs    Patient was in room 204 when he was seen and examined.  Patient is lying in bed resting quietly.  No acute distress noted at this time.  Patient has no acute complaints.  Patient up ambulating in room and tolerating activity well.    ORDERS:   Outpatient order for cardiac event monitor placed through general cardiology office with instructions placed in ADT discharge instructions.  Patient verbalized understanding as well.  FU appointment placed for 3 weeks with COY Harrell as well.    EVENT TIMELINE:   : Successful AREN guided ECV.       Objective     Vital Signs  Temp:  [97.9  °F (36.6 °C)-98.2 °F (36.8 °C)] 97.9 °F (36.6 °C)  Heart Rate:  [62-96] 77  Resp:  [15-22] 20  BP: ()/() 116/83  Device (Oxygen Therapy): room air  Vital Signs (last 72 hrs)         07/09 0700  07/10 0659 07/10 0700 07/11 0659 07/11 0700  07/12 0659 07/12 0700  07/12 0954   Most Recent      Temp (°F)   97.8 -  98.1    97.8 -  98.2      97.9     97.9 (36.6) 07/12 0800    Heart Rate   51 -  165    62 -  137    66 -  91     66 07/12 0900    Resp   15 -  20    15 -  22       16 07/12 0600    BP   77/60 -  132/107    96/75 -  158/112    123/86 -  136/93     131/94 07/12 0900    SpO2 (%)   96 -  100    91 -  100    95 -  99     95 07/12 0900          BMI:Body mass index is 18.63 kg/m².    WEIGHT:      07/10/24  1736 07/11/24  0400 07/12/24  0400   Weight: 56.7 kg (125 lb) 59.5 kg (131 lb 2.8 oz) 62.3 kg (137 lb 5.6 oz)       DIET:Diet: Cardiac; Healthy Heart (2-3 Na+); Fluid Consistency: Thin (IDDSI 0)    I&O:  Intake & Output (last 3 days)         07/09 0701  07/10 0700 07/10 0701  07/11 0700 07/11 0701  07/12 0700 07/12 0701 07/13 0700    P.O.    360    I.V. (mL/kg)  558.1 (9.8)  900 (15.9)    Total Intake(mL/kg)  558.1 (9.8)  1260 (22.2)    Urine (mL/kg/hr)  450 1100 (0.8) 300 (1.8)    Total Output  450 1100 300    Net  +108.1 -1100 +960                     PHYSICAL EXAM:  Constitutional:       Appearance: Healthy appearance. Not in distress.      Comments: BMI 18.63   Eyes:      Conjunctiva/sclera: Conjunctivae normal.      Pupils: Pupils are equal, round, and reactive to light.   HENT:      Nose: Nose normal.    Mouth/Throat:      Dentition: No dental caries.      Pharynx: Oropharynx is clear.   Neck:      Vascular: No JVR. JVD normal.   Pulmonary:      Effort: Pulmonary effort is normal.      Breath sounds: Normal breath sounds. No wheezing. No rhonchi. No rales.   Chest:      Chest wall: Not tender to palpatation.   Cardiovascular:      PMI at left midclavicular line. Normal rate. Regular rhythm.  "Normal S1. Normal S2.       Murmurs: There is no murmur.      No gallop.  No click. No rub.   Pulses:     Intact distal pulses.   Edema:     Peripheral edema absent.   Abdominal:      General: Bowel sounds are normal.      Palpations: Abdomen is soft.      Tenderness: There is no abdominal tenderness.   Musculoskeletal: Normal range of motion.         General: No tenderness.      Cervical back: Normal range of motion and neck supple. Skin:     General: Skin is warm and dry.   Neurological:      General: No focal deficit present.      Mental Status: Alert and oriented to person, place and time.                  Results review   Results Review:    I have reviewed the patient's new clinical results. 07/12/24 12:04 EDT    Results from last 7 days   Lab Units 07/10/24  2105 07/10/24  1803   HSTROP T ng/L 7 9     No results found for: \"PROBNP\"  Results from last 7 days   Lab Units 07/10/24  2302 07/10/24  1803   WBC 10*3/mm3 6.05 7.29   HEMOGLOBIN g/dL 15.2 17.2   PLATELETS 10*3/mm3 232 250     Results from last 7 days   Lab Units 07/12/24  0124 07/10/24  2302 07/10/24  1803   SODIUM mmol/L 130* 137 132*   POTASSIUM mmol/L 4.3 3.6 3.9   CHLORIDE mmol/L 97* 101 94*   CO2 mmol/L 24.7 17.1* 22.1   BUN mg/dL 6 4* 5*   CREATININE mg/dL 0.59* 0.51* 0.63*   CALCIUM mg/dL 8.2* 8.1* 8.9   GLUCOSE mg/dL 95 71 86   ALT (SGPT) U/L  --  18 21   AST (SGOT) U/L  --  33 38     Lab Results   Component Value Date    MG 1.5 (L) 07/12/2024    MG 1.6 07/10/2024     Estimated Creatinine Clearance: 126.1 mL/min (A) (by C-G formula based on SCr of 0.59 mg/dL (L)).    No results found for: \"HGBA1C\"  No results found for: \"CHOL\", \"TRIG\", \"LDL\", \"HDL\"     Lab Results   Component Value Date    INR 1.14 (H) 07/10/2024     Lab Results   Component Value Date    LABHEPA 0.16 (L) 07/11/2024    LABHEPA 0.14 (L) 07/11/2024    LABHEPA <0.10 (L) 07/10/2024       Lab Results   Component Value Date    TSH 1.920 07/10/2024      Pain Management Panel          " Latest Ref Rng & Units 7/10/2024   Pain Management Panel   Amphetamine, Urine Qual Negative Negative    Barbiturates Screen, Urine Negative Negative    Benzodiazepine Screen, Urine Negative Negative    Buprenorphine, Screen, Urine Negative Negative    Cocaine Screen, Urine Negative Negative    Fentanyl, Urine Negative Negative    Methadone Screen , Urine Negative Negative    Methamphetamine, Ur Negative Negative      Microbiology Results (last 10 days)       ** No results found for the last 240 hours. **           Imaging Results (Last 24 Hours)       Procedure Component Value Units Date/Time    US Carotid Bilateral [[id number]] Collected: 07/12/24 0811     Updated: 07/12/24 0815    Narrative:      EXAMINATION: US CAROTID BILATERAL-      Technique: Multiple real-time color Doppler images were acquired of  bilateral carotid arteries.     Stenosis measurements if obtained, were performed by the NASCET or  similar method.        CLINICAL INDICATION:     syncope; I48.92-Unspecified atrial flutter;  I48.92-Unspecified atrial flutter     COMPARISON:    None     FINDINGS:        RIGHT:  No significant intimal thickening or plaque is noted. No occlusion.  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     LEFT:  No significant intimal thickening or plaque is noted. No occlusion.  LEFT CCA PSV: 56 cm/s  LEFT ICA PSV: 47 cm/s  LEFT EDV: 16 cm/s  Left ICA/CCA Ratio: 0.8  Anterograde flow is demonstrated in LEFT vertebral artery.          Impression:      1. No significant plaque. No occlusion.  2. No hemodynamically significant stenosis of either RIGHT or LEFT ICA.  3. Antegrade flow noted left vertebral artery.  4. 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 Consider CTA or MRA for  further evaluation.      This report was finalized on 2024 8:12 AM by Dr. Polo Chambers MD.             ECHO:  Results for orders placed during the hospital encounter of 07/10/24    Adult Transesophageal Echo (AREN) W/ Cont if Necessary Per Protocol    Interpretation Summary    Left ventricular systolic function is normal. Left ventricular ejection fraction appears to be 56 - 60%.    Saline test results are positive for right to left atrial level shunt.      STRESS TEST:  No results found for this or any previous visit.      HEART CATH:  No results found for this or any previous visit.      [id number] post cardioversion       TELEMETRY:      SR 70-80s            I reviewed the patient's new clinical results.    ALLERGIES: Patient has no known allergies.    Medication Review:   Current list of medications may not reflect those currently placed in orders that are not signed or are being held.     apixaban, 5 mg, Oral, Q12H  metoprolol tartrate, 50 mg, Oral, BID  nicotine, 1 patch, Transdermal, Q24H  pantoprazole, 40 mg, Oral, QAM AC  sodium chloride, 10 mL, Intravenous, Q12H  sodium chloride, 10 mL, Intravenous, Q12H      lactated ringers, 75 mL/hr, Last Rate: 75 mL/hr (24 1301)        senna-docusate sodium **AND** polyethylene glycol **AND** bisacodyl **AND** bisacodyl    LORazepam    [COMPLETED] Insert Peripheral IV **AND** sodium chloride    sodium chloride    sodium chloride    sodium chloride    sodium chloride    Assessment    Paroxysmal atrial flutter [patient had cardioversion for atrial flutter almost 20 years ago per patient], [id number] score of 1  Syncopal episodes possibly due to alcohol excess plus atrial flutter with RVR  Essential hypertension  Ongoing tobacco abuse  Ongoing alcohol abuse            Recommendations / Plan   1.  The patient is maintaining sinus rhythm again stressed to the patient regarding avoiding of alcohol will continue with anticoagulation for at least 1 more month  2.  Again advised  "to quit smoking  3.  Patient will get outpatient event monitor for assessment of any further arrhythmia\" of syncope  4.  The patient can be discharged home once okay with medicine service          I have discussed the patients findings and recommendations with the patient.    Thank you very much for asking us to be involved in this patient's care.  We will follow along with you.    Electronically signed by COY Barajas, 07/12/24, 12:07 PM EDT.   Electronically signed by Yanick Bryant MD, 07/12/24, 12:38 PM EDT.                      Please note that portions of this note were completed with a voice recognition program.    Please note that portions of this note were copied and has been reviewed and is accurate as of 7/12/2024 .     "

## 2024-07-12 NOTE — DISCHARGE INSTR - APPOINTMENTS
Cardiology will send a request to scheduling for appt, then they'll call PT with appt    Schedule PCP with Ramón Lyons on 7/18/24 @ 11:15 am, gave appt card

## 2024-07-12 NOTE — CONSULTS
According to Santana, he already has Advanced Care Plan documents filled out through another company.

## 2024-07-12 NOTE — TELEPHONE ENCOUNTER
Caller: TRAE TINOCO    Relationship to patient: Provider    Best call back number: CALL PT     Chief complaint: AFIB     Type of visit: HOS F/U     Requested date: 2-3 WEEKS      Additional notes:CONSULTED BY DR. CAZARES ON 7/11/24   NEXT AVAILABLE IS 8/6/24